# Patient Record
Sex: FEMALE | Race: WHITE | NOT HISPANIC OR LATINO | Employment: OTHER | URBAN - METROPOLITAN AREA
[De-identification: names, ages, dates, MRNs, and addresses within clinical notes are randomized per-mention and may not be internally consistent; named-entity substitution may affect disease eponyms.]

---

## 2022-08-26 ENCOUNTER — PATIENT ROUNDING (BHMG ONLY) (OUTPATIENT)
Dept: INTERNAL MEDICINE | Facility: CLINIC | Age: 67
End: 2022-08-26

## 2022-08-26 ENCOUNTER — TRANSCRIBE ORDERS (OUTPATIENT)
Dept: ADMINISTRATIVE | Facility: HOSPITAL | Age: 67
End: 2022-08-26

## 2022-08-26 ENCOUNTER — OFFICE VISIT (OUTPATIENT)
Dept: INTERNAL MEDICINE | Facility: CLINIC | Age: 67
End: 2022-08-26

## 2022-08-26 VITALS
HEIGHT: 72 IN | OXYGEN SATURATION: 97 % | HEART RATE: 89 BPM | SYSTOLIC BLOOD PRESSURE: 100 MMHG | DIASTOLIC BLOOD PRESSURE: 60 MMHG | BODY MASS INDEX: 23.57 KG/M2 | TEMPERATURE: 96.9 F | WEIGHT: 174 LBS

## 2022-08-26 DIAGNOSIS — Z00.00 PHYSICAL EXAM, ANNUAL: Primary | ICD-10-CM

## 2022-08-26 DIAGNOSIS — Z12.31 ENCOUNTER FOR SCREENING MAMMOGRAM FOR MALIGNANT NEOPLASM OF BREAST: ICD-10-CM

## 2022-08-26 DIAGNOSIS — F17.200 CURRENT SMOKER: ICD-10-CM

## 2022-08-26 DIAGNOSIS — Z71.6 ENCOUNTER FOR SMOKING CESSATION COUNSELING: ICD-10-CM

## 2022-08-26 DIAGNOSIS — E04.1 THYROID NODULE: ICD-10-CM

## 2022-08-26 DIAGNOSIS — Z12.31 BREAST CANCER SCREENING BY MAMMOGRAM: Primary | ICD-10-CM

## 2022-08-26 DIAGNOSIS — Z78.0 POSTMENOPAUSAL: ICD-10-CM

## 2022-08-26 DIAGNOSIS — Z12.2 SCREENING FOR LUNG CANCER: ICD-10-CM

## 2022-08-26 DIAGNOSIS — Z12.11 SCREENING FOR COLON CANCER: ICD-10-CM

## 2022-08-26 PROBLEM — J30.1 SEASONAL ALLERGIC RHINITIS DUE TO POLLEN: Status: ACTIVE | Noted: 2022-08-26

## 2022-08-26 PROBLEM — H91.91 DECREASED HEARING OF RIGHT EAR: Status: ACTIVE | Noted: 2022-08-26

## 2022-08-26 PROBLEM — G43.009 MIGRAINE WITHOUT AURA, NOT INTRACTABLE: Status: ACTIVE | Noted: 2022-08-26

## 2022-08-26 PROBLEM — C44.90 SKIN CANCER: Status: ACTIVE | Noted: 2022-08-26

## 2022-08-26 PROCEDURE — 99387 INIT PM E/M NEW PAT 65+ YRS: CPT | Performed by: FAMILY MEDICINE

## 2022-08-26 PROCEDURE — 90677 PCV20 VACCINE IM: CPT | Performed by: FAMILY MEDICINE

## 2022-08-26 PROCEDURE — 99202 OFFICE O/P NEW SF 15 MIN: CPT | Performed by: FAMILY MEDICINE

## 2022-08-26 PROCEDURE — 90471 IMMUNIZATION ADMIN: CPT | Performed by: FAMILY MEDICINE

## 2022-08-26 NOTE — PROGRESS NOTES
Subjective   Linnea Magdaleno is a 67 y.o. female.   Chief Complaint   Patient presents with   • Annual Exam       History of Present Illness     This is a new patient in our office.    1. CPE-she is here for complete physical exam without GYN evaluation.    She has no complaints.  She has a history of migraine headaches.  She was diagnosed in 1980s.  She had last migraine 3 years ago.  Very infrequent.  She responds to Imitrex.  She does not need refill.  Allergies - she has allergic rhinitis.  She is allergic to pollen and molds and fragrances.  She has no history of asthma.  Skin cancer-basal and squamous.  She was diagnosed in 2022.  She sees dermatologist every 6 months.    Over-the-counter-she only takes allergy medications.  She is allergic to codeine and oxycodone.    She smokes cigarettes for years.  She started in 1980.  She smoked half pack per day for 14 years and in the last 28 years she smokes 2 packs/day.  She says she has no shortness of breath, no wheezing.  She only has cough when her allergies are acting up.  She quit smoking once for 9 months.  She did it with Chantix and patches.  She did not like the way Chantix made her feel.  She does not want to use it again.  She is not ready to quit smoking.    She drinks a few drinks a year.  She does not use drugs.  She is a .    Last dental appointment was years ago.  Last eye exam was last month.  She has never had colon cancer screening.  Mammogram many years ago.  She does not get anymore Pap smears.  She is post hysterectomy which was secondary to benign condition.  She has never had lung cancer screening.    She had tetanus vaccine in 2021.  Flu vaccine last season.  She had COVID-vaccine x2.  She had COVID infection 3 weeks ago.  She recovered fully.        The following portions of the patient's history were reviewed and updated as appropriate: allergies, current medications, past family history, past medical history, past  social history, past surgical history and problem list.    Review of Systems   Constitutional: Negative for chills and fever.   HENT: Negative for congestion and rhinorrhea.    Respiratory: Negative for cough and shortness of breath.    Cardiovascular: Negative for chest pain and palpitations.   Gastrointestinal: Negative for blood in stool.   Neurological: Negative for light-headedness.   Psychiatric/Behavioral: Negative.          Objective   Wt Readings from Last 3 Encounters:   08/26/22 78.9 kg (174 lb)      Vitals:    08/26/22 0748   BP: 100/60   Pulse: 89   Temp: 96.9 °F (36.1 °C)   SpO2: 97%     Temp Readings from Last 3 Encounters:   08/26/22 96.9 °F (36.1 °C)     BP Readings from Last 3 Encounters:   08/26/22 100/60     Pulse Readings from Last 3 Encounters:   08/26/22 89     Body mass index is 23.6 kg/m².    Physical Exam  Vitals reviewed.   Constitutional:       General: She is not in acute distress.     Appearance: She is well-developed. She is not diaphoretic.   HENT:      Head: Normocephalic and atraumatic. Hair is normal.      Right Ear: Hearing, tympanic membrane, ear canal and external ear normal. No decreased hearing noted. No drainage.      Left Ear: Hearing, tympanic membrane, ear canal and external ear normal. No decreased hearing noted.      Nose: No nasal deformity.   Eyes:      General: Lids are normal.         Right eye: No discharge.         Left eye: No discharge.      Conjunctiva/sclera: Conjunctivae normal.      Pupils: Pupils are equal, round, and reactive to light.   Neck:      Thyroid: No thyromegaly.      Vascular: No JVD.      Trachea: No tracheal deviation.      Comments: ~1.5 cm left-sided thyroid nodule.  Soft.  Cardiovascular:      Rate and Rhythm: Normal rate and regular rhythm.      Pulses: Normal pulses.      Heart sounds: Normal heart sounds. No murmur heard.    No friction rub. No gallop.   Pulmonary:      Effort: Pulmonary effort is normal. No respiratory distress.       Breath sounds: Normal breath sounds. No wheezing or rales.   Chest:      Chest wall: No tenderness.   Breasts:      Right: Normal. No swelling, bleeding, inverted nipple, mass, nipple discharge, skin change, tenderness, axillary adenopathy or supraclavicular adenopathy.      Left: Normal. No swelling, bleeding, inverted nipple, mass, nipple discharge, skin change, tenderness, axillary adenopathy or supraclavicular adenopathy.       Abdominal:      General: There is no distension.      Palpations: Abdomen is soft. There is no mass.      Tenderness: There is no abdominal tenderness. There is no guarding or rebound.      Hernia: No hernia is present.   Musculoskeletal:         General: No tenderness or deformity. Normal range of motion.      Cervical back: Normal range of motion and neck supple.   Lymphadenopathy:      Cervical: No cervical adenopathy.      Upper Body:      Right upper body: No supraclavicular or axillary adenopathy.      Left upper body: No supraclavicular or axillary adenopathy.   Skin:     General: Skin is warm and dry.      Findings: No erythema or rash.   Neurological:      Mental Status: She is alert and oriented to person, place, and time.      Cranial Nerves: No cranial nerve deficit.      Motor: No abnormal muscle tone.      Coordination: Coordination normal.      Deep Tendon Reflexes: Reflexes are normal and symmetric. Reflexes normal.   Psychiatric:         Behavior: Behavior normal.         Thought Content: Thought content normal.         Judgment: Judgment normal.         Assessment & Plan   Diagnoses and all orders for this visit:    1. Physical exam, annual (Primary)  -     CBC (No Diff)  -     Comprehensive Metabolic Panel  -     Lipid Panel With LDL / HDL Ratio  -     Thyroid Cascade Profile  -     Urinalysis With Microscopic If Indicated (No Culture) - Urine, Clean Catch  -     Hepatitis C Antibody    2. Encounter for screening mammogram for malignant neoplasm of breast  -     Mammo  Screening Bilateral With CAD; Future    3. Screening for colon cancer  -     Cologuard - Stool, Per Rectum; Future    4. Screening for lung cancer  -      CT Chest Low Dose Cancer Screening WO; Future    5. Current smoker  -      CT Chest Low Dose Cancer Screening WO; Future    6. Encounter for smoking cessation counseling    7. Postmenopausal  -     DEXA Bone Density Axial; Future    8. Thyroid nodule  -     US Thyroid; Future        1. CPE-preventive counseling provided.  We are ordering labs.  We are ordering mammogram, low-dose chest CT and Cologuard.  Colonoscopy versus Cologuard discussed and patient preferred Cologuard.  She is due for bone density and we are ordering it.  Patient is getting pneumonia vaccine today.  She is advised to get Shingrix vaccine.  She is advised to have her hearing tested.  She is advised to schedule office visit with a dentist.  She should start to exercise at least 30 minutes a day, 5 days a week.  Information on healthy heart diet provided.  Sun protection discussed and recommended.    2.smoking cessation- risks of smoking discussed.  Patient not ready to set up a quit date.  Information on quit smoking provided.4 min .  3.  Thyroid nodule -new problem.  We are ordering thyroid ultrasound.  Further recommendations depend on test results.

## 2022-08-26 NOTE — PROGRESS NOTES
August 26, 2022    Patient was referred by her daughter and she did not have any problems making her new patient appointment. Patient states her visit was perfect and she would refer us to her family and friends.

## 2022-09-07 LAB
ALBUMIN SERPL-MCNC: 4.5 G/DL (ref 3.5–5.2)
ALBUMIN/GLOB SERPL: 1.9 G/DL
ALP SERPL-CCNC: 92 U/L (ref 39–117)
ALT SERPL-CCNC: 26 U/L (ref 1–33)
AST SERPL-CCNC: 30 U/L (ref 1–32)
BILIRUB SERPL-MCNC: 0.5 MG/DL (ref 0–1.2)
BUN SERPL-MCNC: 12 MG/DL (ref 8–23)
BUN/CREAT SERPL: 13.3 (ref 7–25)
CALCIUM SERPL-MCNC: 9.5 MG/DL (ref 8.6–10.5)
CHLORIDE SERPL-SCNC: 110 MMOL/L (ref 98–107)
CHOLEST SERPL-MCNC: 201 MG/DL (ref 0–200)
CO2 SERPL-SCNC: 15 MMOL/L (ref 22–29)
CREAT SERPL-MCNC: 0.9 MG/DL (ref 0.57–1)
EGFRCR-CYS SERPLBLD CKD-EPI 2021: 70.2 ML/MIN/1.73
ERYTHROCYTE [DISTWIDTH] IN BLOOD BY AUTOMATED COUNT: 13.2 % (ref 12.3–15.4)
GLOBULIN SER CALC-MCNC: 2.4 GM/DL
GLUCOSE SERPL-MCNC: 89 MG/DL (ref 65–99)
HCT VFR BLD AUTO: 42.5 % (ref 34–46.6)
HDLC SERPL-MCNC: 68 MG/DL (ref 40–60)
HGB BLD-MCNC: 14.4 G/DL (ref 12–15.9)
LDLC SERPL CALC-MCNC: 114 MG/DL (ref 0–100)
LDLC/HDLC SERPL: 1.65 {RATIO}
MCH RBC QN AUTO: 31.8 PG (ref 26.6–33)
MCHC RBC AUTO-ENTMCNC: 33.9 G/DL (ref 31.5–35.7)
MCV RBC AUTO: 93.8 FL (ref 79–97)
PLATELET # BLD AUTO: 303 10*3/MM3 (ref 140–450)
POTASSIUM SERPL-SCNC: 4.4 MMOL/L (ref 3.5–5.2)
PROT SERPL-MCNC: 6.9 G/DL (ref 6–8.5)
RBC # BLD AUTO: 4.53 10*6/MM3 (ref 3.77–5.28)
REQUEST PROBLEM: NORMAL
SODIUM SERPL-SCNC: 142 MMOL/L (ref 136–145)
TRIGL SERPL-MCNC: 105 MG/DL (ref 0–150)
TSH SERPL DL<=0.005 MIU/L-ACNC: 1.11 UIU/ML (ref 0.45–4.5)
VLDLC SERPL CALC-MCNC: 19 MG/DL (ref 5–40)
WBC # BLD AUTO: 6.35 10*3/MM3 (ref 3.4–10.8)

## 2022-10-26 ENCOUNTER — HOSPITAL ENCOUNTER (OUTPATIENT)
Dept: ULTRASOUND IMAGING | Facility: HOSPITAL | Age: 67
Discharge: HOME | End: 2022-10-26

## 2022-10-26 ENCOUNTER — HOSPITAL ENCOUNTER (OUTPATIENT)
Dept: CT IMAGING | Facility: HOSPITAL | Age: 67
Discharge: HOME | End: 2022-10-26

## 2022-10-26 DIAGNOSIS — Z12.2 SCREENING FOR LUNG CANCER: ICD-10-CM

## 2022-10-26 DIAGNOSIS — F17.200 CURRENT SMOKER: ICD-10-CM

## 2022-10-26 DIAGNOSIS — E04.1 THYROID NODULE: ICD-10-CM

## 2022-10-26 PROCEDURE — 71271 CT THORAX LUNG CANCER SCR C-: CPT

## 2022-10-26 PROCEDURE — 76536 US EXAM OF HEAD AND NECK: CPT

## 2022-10-31 DIAGNOSIS — R91.1 LUNG NODULE: Primary | ICD-10-CM

## 2022-11-07 DIAGNOSIS — E04.2 MULTIPLE THYROID NODULES: Primary | ICD-10-CM

## 2023-03-01 ENCOUNTER — HOSPITAL ENCOUNTER (OUTPATIENT)
Dept: BONE DENSITY | Facility: HOSPITAL | Age: 68
Discharge: HOME | End: 2023-03-01
Payer: MEDICARE

## 2023-03-01 ENCOUNTER — HOSPITAL ENCOUNTER (OUTPATIENT)
Dept: MAMMOGRAPHY | Facility: HOSPITAL | Age: 68
Discharge: HOME | End: 2023-03-01
Payer: MEDICARE

## 2023-03-01 DIAGNOSIS — Z12.31 BREAST CANCER SCREENING BY MAMMOGRAM: ICD-10-CM

## 2023-03-01 DIAGNOSIS — Z78.0 POSTMENOPAUSAL: ICD-10-CM

## 2023-03-01 PROCEDURE — 77067 SCR MAMMO BI INCL CAD: CPT

## 2023-03-01 PROCEDURE — 77080 DXA BONE DENSITY AXIAL: CPT

## 2023-03-01 PROCEDURE — 77063 BREAST TOMOSYNTHESIS BI: CPT

## 2023-03-06 ENCOUNTER — TELEPHONE (OUTPATIENT)
Dept: INTERNAL MEDICINE | Facility: CLINIC | Age: 68
End: 2023-03-06
Payer: MEDICARE

## 2023-03-06 DIAGNOSIS — Z12.31 ENCOUNTER FOR SCREENING MAMMOGRAM FOR MALIGNANT NEOPLASM OF BREAST: Primary | ICD-10-CM

## 2023-03-06 DIAGNOSIS — R92.8 ABNORMAL MAMMOGRAM: ICD-10-CM

## 2023-03-06 RX ORDER — SUMATRIPTAN 50 MG/1
TABLET, FILM COATED ORAL
Qty: 9 TABLET | Refills: 2 | Status: SHIPPED | OUTPATIENT
Start: 2023-03-06
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
## 2023-03-20 ENCOUNTER — OFFICE VISIT (OUTPATIENT)
Dept: INTERNAL MEDICINE | Facility: CLINIC | Age: 68
End: 2023-03-20
Payer: MEDICARE

## 2023-03-20 VITALS
OXYGEN SATURATION: 97 % | HEART RATE: 102 BPM | WEIGHT: 174 LBS | HEIGHT: 72 IN | SYSTOLIC BLOOD PRESSURE: 120 MMHG | BODY MASS INDEX: 23.57 KG/M2 | DIASTOLIC BLOOD PRESSURE: 72 MMHG | TEMPERATURE: 96.6 F

## 2023-03-20 DIAGNOSIS — F41.8 SITUATIONAL ANXIETY: ICD-10-CM

## 2023-03-20 DIAGNOSIS — M81.0 AGE-RELATED OSTEOPOROSIS WITHOUT CURRENT PATHOLOGICAL FRACTURE: Primary | ICD-10-CM

## 2023-03-20 PROCEDURE — 1160F RVW MEDS BY RX/DR IN RCRD: CPT | Performed by: FAMILY MEDICINE

## 2023-03-20 PROCEDURE — 1159F MED LIST DOCD IN RCRD: CPT | Performed by: FAMILY MEDICINE

## 2023-03-20 PROCEDURE — 99214 OFFICE O/P EST MOD 30 MIN: CPT | Performed by: FAMILY MEDICINE

## 2023-03-20 RX ORDER — ALENDRONATE SODIUM 70 MG/1
70 TABLET ORAL
Qty: 12 TABLET | Refills: 3 | Status: SHIPPED | OUTPATIENT
Start: 2023-03-20

## 2023-03-20 RX ORDER — HYDROXYZINE HYDROCHLORIDE 25 MG/1
25 TABLET, FILM COATED ORAL 3 TIMES DAILY PRN
Qty: 30 TABLET | Refills: 1 | Status: SHIPPED | OUTPATIENT
Start: 2023-03-20

## 2023-03-20 NOTE — PROGRESS NOTES
Subjective   Linnea Magdaleno is a 67 y.o. female.   Chief Complaint   Patient presents with   • Osteoporosis   • Anxiety       History of Present Illness     1.  Osteoporosis- patient had bone density done in March 2023.  It was positive for osteoporosis.  T score in lumbar spine was -2.7, in left hip -2.3, and right hip -2.0.  She reports normal balance.  No falls.  She has no history of peptic ulcer.  No GERD.    2.  Anxiety- she has lots of stress lately related to the deteriorating health of her mom.  She takes care of her mom.  She worries about her.  She would like to have an as needed medication to take for anxiety.  MIRZA-7 at 6.     Patient had an abnormal mammogram in March 2023.  Ultrasound and diagnostic mammogram were ordered, but she did not have a chance to schedule it because she was taking care of her mom.  And then she forgot about it.    The following portions of the patient's history were reviewed and updated as appropriate: allergies, current medications, past family history, past medical history, past social history, past surgical history and problem list.    Review of Systems   Respiratory: Negative.    Cardiovascular: Negative.    Gastrointestinal: Negative.    Neurological: Negative.    Psychiatric/Behavioral: Negative for suicidal ideas. The patient is nervous/anxious.          Objective   Wt Readings from Last 3 Encounters:   03/20/23 78.9 kg (174 lb)   08/26/22 78.9 kg (174 lb)      Vitals:    03/20/23 0722   BP: 120/72   Pulse: 102   Temp: 96.6 °F (35.9 °C)   SpO2: 97%     Temp Readings from Last 3 Encounters:   03/20/23 96.6 °F (35.9 °C)   08/26/22 96.9 °F (36.1 °C)     BP Readings from Last 3 Encounters:   03/20/23 120/72   08/26/22 100/60     Pulse Readings from Last 3 Encounters:   03/20/23 102   08/26/22 89     Body mass index is 23.59 kg/m².    Physical Exam  Constitutional:       Appearance: She is well-developed.   Cardiovascular:      Rate and Rhythm: Normal rate and regular  rhythm.      Heart sounds: Normal heart sounds.   Pulmonary:      Effort: Pulmonary effort is normal.      Breath sounds: Normal breath sounds.   Skin:     General: Skin is warm and dry.   Neurological:      Mental Status: She is alert and oriented to person, place, and time.   Psychiatric:         Behavior: Behavior normal.         Assessment & Plan   Diagnoses and all orders for this visit:    1. Age-related osteoporosis without current pathological fracture (Primary)  -     Vitamin D,25-Hydroxy    2. Situational anxiety    Other orders  -     alendronate (Fosamax) 70 MG tablet; Take 1 tablet by mouth Every 7 (Seven) Days.  Dispense: 12 tablet; Refill: 3  -     hydrOXYzine (ATARAX) 25 MG tablet; Take 1 tablet by mouth 3 (Three) Times a Day As Needed for Anxiety.  Dispense: 30 tablet; Refill: 1      1.  Osteoporosis- new diagnosis.  We are starting Fosamax.  Side effects including jaw necrosis discussed.  Patient is advised to notify her dentist about starting it.  We are checking vitamin D levels.  She is advised to get 1200 mg of calcium a day, preferably from diet.  Weightbearing exercise.  Yoga/domingo chi to keep good balance.  Fall prevention discussed.    2.  Situational anxiety-we will prescribe hydroxyzine to be used as needed.  Warnings on drowsiness.  Follow-up in 6 months or sooner if problems.    Abnormal mammogram- discussed with patient importance of following up.  She is going to call and schedule it.  She just forgot about it because she was busy  caring for her mom.              BMI is within normal parameters. No other follow-up for BMI required.

## 2023-04-17 ENCOUNTER — HOSPITAL ENCOUNTER (OUTPATIENT)
Dept: ULTRASOUND IMAGING | Facility: HOSPITAL | Age: 68
Discharge: HOME | End: 2023-04-17
Payer: MEDICARE

## 2023-04-17 ENCOUNTER — HOSPITAL ENCOUNTER (OUTPATIENT)
Dept: MAMMOGRAPHY | Facility: HOSPITAL | Age: 68
Discharge: HOME | End: 2023-04-17
Payer: MEDICARE

## 2023-04-17 DIAGNOSIS — R92.8 ABNORMAL MAMMOGRAM: ICD-10-CM

## 2023-04-17 DIAGNOSIS — R92.8 ABNORMAL MAMMOGRAM: Primary | ICD-10-CM

## 2023-04-17 PROCEDURE — 76642 ULTRASOUND BREAST LIMITED: CPT

## 2023-04-17 PROCEDURE — 77065 DX MAMMO INCL CAD UNI: CPT

## 2023-04-17 PROCEDURE — G0279 TOMOSYNTHESIS, MAMMO: HCPCS

## 2023-04-18 ENCOUNTER — TELEPHONE (OUTPATIENT)
Dept: INTERNAL MEDICINE | Facility: CLINIC | Age: 68
End: 2023-04-18
Payer: MEDICARE
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
## 2023-05-02 ENCOUNTER — TELEPHONE (OUTPATIENT)
Dept: INTERNAL MEDICINE | Facility: CLINIC | Age: 68
End: 2023-05-02
Payer: MEDICARE
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
## 2023-05-02 NOTE — TELEPHONE ENCOUNTER
Patient returned call, she said that she is still out of town and will be back by next Wednesday. Will send in Imitrex refill when she is back in town and able to pick it up from the pharmacy. Transferred call to the front to schedule an appointment with same day labs to discuss switching osteoporosis med from fosamax.

## 2023-05-09 ENCOUNTER — TELEPHONE (OUTPATIENT)
Dept: INTERNAL MEDICINE | Facility: CLINIC | Age: 68
End: 2023-05-09
Payer: MEDICARE

## 2023-05-09 RX ORDER — SUMATRIPTAN 50 MG/1
TABLET, FILM COATED ORAL
Qty: 9 TABLET | Refills: 2 | Status: SHIPPED | OUTPATIENT
Start: 2023-05-09

## 2023-05-09 NOTE — TELEPHONE ENCOUNTER
Rx sent to pharmacy. Called and informed patient.      ----- Message from Anupama Nugent MA sent at 5/2/2023  4:39 PM EDT -----  Send in Imitrex refill for patient - back in [facility] [address] by 5/10.

## 2023-05-31 ENCOUNTER — HOSPITAL ENCOUNTER (OUTPATIENT)
Dept: CT IMAGING | Facility: HOSPITAL | Age: 68
Discharge: HOME | End: 2023-05-31
Admitting: FAMILY MEDICINE

## 2023-05-31 PROCEDURE — 71250 CT THORAX DX C-: CPT

## 2023-06-08 DIAGNOSIS — Z12.2 SCREENING FOR LUNG CANCER: Primary | ICD-10-CM

## 2023-06-10 LAB — 25(OH)D3+25(OH)D2 SERPL-MCNC: 40 NG/ML (ref 30–100)

## 2023-06-13 DIAGNOSIS — Z12.2 ENCOUNTER FOR SCREENING FOR LUNG CANCER: ICD-10-CM

## 2023-06-13 DIAGNOSIS — F17.200 CURRENT SMOKER: ICD-10-CM

## 2023-06-13 DIAGNOSIS — Z12.2 SCREENING FOR LUNG CANCER: Primary | ICD-10-CM

## 2023-06-15 DIAGNOSIS — Z87.891 PERSONAL HISTORY OF NICOTINE DEPENDENCE: ICD-10-CM

## 2023-06-15 DIAGNOSIS — F17.200 CURRENT SMOKER: Primary | ICD-10-CM

## 2023-07-12 PROBLEM — M81.0 AGE-RELATED OSTEOPOROSIS WITHOUT CURRENT PATHOLOGICAL FRACTURE: Status: ACTIVE | Noted: 2023-07-12

## 2023-07-27 ENCOUNTER — HOSPITAL ENCOUNTER (OUTPATIENT)
Dept: INFUSION THERAPY | Facility: HOSPITAL | Age: 68
Discharge: HOME | End: 2023-07-27
Payer: MEDICARE

## 2023-07-27 VITALS
WEIGHT: 175 LBS | HEART RATE: 73 BPM | BODY MASS INDEX: 23.73 KG/M2 | DIASTOLIC BLOOD PRESSURE: 82 MMHG | SYSTOLIC BLOOD PRESSURE: 122 MMHG | OXYGEN SATURATION: 99 % | RESPIRATION RATE: 20 BRPM | TEMPERATURE: 97.2 F

## 2023-07-27 DIAGNOSIS — M81.0 AGE-RELATED OSTEOPOROSIS WITHOUT CURRENT PATHOLOGICAL FRACTURE: Primary | ICD-10-CM

## 2023-07-27 PROCEDURE — 25010000002 ZOLEDRONIC ACID 5 MG/100ML SOLUTION: Performed by: FAMILY MEDICINE

## 2023-07-27 PROCEDURE — 96365 THER/PROPH/DIAG IV INF INIT: CPT

## 2023-07-27 PROCEDURE — 96374 THER/PROPH/DIAG INJ IV PUSH: CPT

## 2023-07-27 RX ORDER — SODIUM CHLORIDE 9 MG/ML
250 INJECTION, SOLUTION INTRAVENOUS ONCE
OUTPATIENT
Start: 2024-07-26

## 2023-07-27 RX ORDER — SODIUM CHLORIDE 9 MG/ML
250 INJECTION, SOLUTION INTRAVENOUS ONCE
Status: DISCONTINUED | OUTPATIENT
Start: 2023-07-27 | End: 2023-07-29 | Stop reason: HOSPADM

## 2023-07-27 RX ORDER — ZOLEDRONIC ACID 5 MG/100ML
5 INJECTION, SOLUTION INTRAVENOUS ONCE
Status: COMPLETED | OUTPATIENT
Start: 2023-07-27 | End: 2023-07-27

## 2023-07-27 RX ORDER — ZOLEDRONIC ACID 5 MG/100ML
5 INJECTION, SOLUTION INTRAVENOUS ONCE
Status: CANCELLED | OUTPATIENT
Start: 2024-07-26

## 2023-07-27 RX ORDER — ZOLEDRONIC ACID 5 MG/100ML
5 INJECTION, SOLUTION INTRAVENOUS ONCE
COMMUNITY

## 2023-07-27 RX ADMIN — ZOLEDRONIC ACID 5 MG: 0.05 INJECTION, SOLUTION INTRAVENOUS at 09:06

## 2023-08-22 DIAGNOSIS — Z00.00 PHYSICAL EXAM, ANNUAL: Primary | ICD-10-CM

## 2023-08-23 LAB
ALBUMIN SERPL-MCNC: 4.6 G/DL (ref 3.5–5.2)
ALBUMIN/GLOB SERPL: 1.8 G/DL
ALP SERPL-CCNC: 105 U/L (ref 39–117)
ALT SERPL-CCNC: 27 U/L (ref 1–33)
AST SERPL-CCNC: 19 U/L (ref 1–32)
BILIRUB SERPL-MCNC: 0.5 MG/DL (ref 0–1.2)
BUN SERPL-MCNC: 14 MG/DL (ref 8–23)
BUN/CREAT SERPL: 16.1 (ref 7–25)
CALCIUM SERPL-MCNC: 9.4 MG/DL (ref 8.6–10.5)
CHLORIDE SERPL-SCNC: 106 MMOL/L (ref 98–107)
CHOLEST SERPL-MCNC: 206 MG/DL (ref 0–200)
CO2 SERPL-SCNC: 24.8 MMOL/L (ref 22–29)
CREAT SERPL-MCNC: 0.87 MG/DL (ref 0.57–1)
EGFRCR SERPLBLD CKD-EPI 2021: 72.7 ML/MIN/1.73
ERYTHROCYTE [DISTWIDTH] IN BLOOD BY AUTOMATED COUNT: 12.9 % (ref 12.3–15.4)
GLOBULIN SER CALC-MCNC: 2.5 GM/DL
GLUCOSE SERPL-MCNC: 90 MG/DL (ref 65–99)
HCT VFR BLD AUTO: 43.2 % (ref 34–46.6)
HCV IGG SERPL QL IA: NON REACTIVE
HDLC SERPL-MCNC: 59 MG/DL (ref 40–60)
HGB BLD-MCNC: 14.6 G/DL (ref 12–15.9)
LDLC SERPL CALC-MCNC: 116 MG/DL (ref 0–100)
LDLC/HDLC SERPL: 1.88 {RATIO}
MCH RBC QN AUTO: 31.8 PG (ref 26.6–33)
MCHC RBC AUTO-ENTMCNC: 33.8 G/DL (ref 31.5–35.7)
MCV RBC AUTO: 94.1 FL (ref 79–97)
PLATELET # BLD AUTO: 333 10*3/MM3 (ref 140–450)
POTASSIUM SERPL-SCNC: 4.5 MMOL/L (ref 3.5–5.2)
PROT SERPL-MCNC: 7.1 G/DL (ref 6–8.5)
RBC # BLD AUTO: 4.59 10*6/MM3 (ref 3.77–5.28)
SODIUM SERPL-SCNC: 141 MMOL/L (ref 136–145)
TRIGL SERPL-MCNC: 180 MG/DL (ref 0–150)
TSH SERPL DL<=0.005 MIU/L-ACNC: 1.39 UIU/ML (ref 0.45–4.5)
VLDLC SERPL CALC-MCNC: 31 MG/DL (ref 5–40)
WBC # BLD AUTO: 10.56 10*3/MM3 (ref 3.4–10.8)

## 2023-08-29 ENCOUNTER — OFFICE VISIT (OUTPATIENT)
Dept: INTERNAL MEDICINE | Facility: CLINIC | Age: 68
End: 2023-08-29
Payer: MEDICARE

## 2023-08-29 VITALS
SYSTOLIC BLOOD PRESSURE: 118 MMHG | TEMPERATURE: 98.2 F | HEART RATE: 72 BPM | BODY MASS INDEX: 24.38 KG/M2 | DIASTOLIC BLOOD PRESSURE: 72 MMHG | OXYGEN SATURATION: 97 % | WEIGHT: 180 LBS | HEIGHT: 72 IN

## 2023-08-29 DIAGNOSIS — Z00.00 WELCOME TO MEDICARE PREVENTIVE VISIT: ICD-10-CM

## 2023-08-29 DIAGNOSIS — Z00.00 PHYSICAL EXAM, ANNUAL: Primary | ICD-10-CM

## 2023-08-29 DIAGNOSIS — Z71.6 ENCOUNTER FOR SMOKING CESSATION COUNSELING: ICD-10-CM

## 2023-08-29 DIAGNOSIS — E04.1 THYROID NODULE: ICD-10-CM

## 2023-08-29 DIAGNOSIS — G43.009 MIGRAINE WITHOUT AURA AND WITHOUT STATUS MIGRAINOSUS, NOT INTRACTABLE: ICD-10-CM

## 2023-08-29 PROCEDURE — G0402 INITIAL PREVENTIVE EXAM: HCPCS | Performed by: FAMILY MEDICINE

## 2023-08-29 PROCEDURE — 1159F MED LIST DOCD IN RCRD: CPT | Performed by: FAMILY MEDICINE

## 2023-08-29 PROCEDURE — 1170F FXNL STATUS ASSESSED: CPT | Performed by: FAMILY MEDICINE

## 2023-08-29 PROCEDURE — 1160F RVW MEDS BY RX/DR IN RCRD: CPT | Performed by: FAMILY MEDICINE

## 2023-08-29 PROCEDURE — 99397 PER PM REEVAL EST PAT 65+ YR: CPT | Performed by: FAMILY MEDICINE

## 2023-08-29 NOTE — PATIENT INSTRUCTIONS
Medicare Wellness  Personal Prevention Plan of Service     Date of Office Visit:    Encounter Provider:  Lacy Rivera MD  Place of Service:  [facility]  Patient Name: Linnea Magdaleno  :  1955    As part of the Medicare Wellness portion of your visit today, we are providing you with this personalized preventive plan of services (PPPS). This plan is based upon recommendations of the [place] Preventive Services Task Force (USPSTF) and the Advisory Committee on Immunization Practices (ACIP).    This lists the preventive care services that should be considered, and provides dates of when you are due. Items listed as completed are up-to-date and do not require any further intervention.    Health Maintenance   Topic Date Due    ANNUAL WELLNESS VISIT  Never done    COVID-19 Vaccine (4 - Pfizer series) 2023 (Originally 2023)    ZOSTER VACCINE (1 of 2) 2025 (Originally 2005)    INFLUENZA VACCINE  10/01/2023    LUNG CANCER SCREENING  2024    DXA SCAN  2025    MAMMOGRAM  2025    COLORECTAL CANCER SCREENING  2025    TDAP/TD VACCINES (2 - Td or Tdap) 10/11/2031    HEPATITIS C SCREENING  Completed    Pneumococcal Vaccine 65+  Completed       Orders Placed This Encounter   Procedures    US Thyroid     Standing Status:   Future     Standing Expiration Date:   2024     Scheduling Instructions:      Due in 10/2023     Order Specific Question:   Reason for Exam:     Answer:   nodules     Order Specific Question:   Release to patient     Answer:   Routine Release [[id number]]       Return in about 6 months (around 2024).

## 2023-08-29 NOTE — PROGRESS NOTES
The ABCs of the Annual Wellness Visit  [facility]    Subjective     Linnea Magdaleno is a 68 y.o. female who presents for a  Welcome to Medicare Visit.    The following portions of the patient's history were reviewed and   updated as appropriate: allergies, current medications, past family history, past medical history, past social history, past surgical history, and problem list.     Compared to one year ago, the patient feels her physical   health is the same.    Compared to one year ago, the patient feels her mental   health is the same.    Recent Hospitalizations:  She was not admitted to the hospital during the last year.       Current Medical Providers:  Patient Care Team:  Lacy Rivera MD as PCP - General (Family Medicine)    Outpatient Medications Prior to Visit   Medication Sig Dispense Refill    hydrOXYzine (ATARAX) 25 MG tablet Take 1 tablet by mouth 3 (Three) Times a Day As Needed for Anxiety. 30 tablet 0    SUMAtriptan (Imitrex) 50 MG tablet Take one tablet at onset of headache. May repeat dose one time in 2 hours if headache not relieved. 9 tablet 2    zoledronic acid (RECLAST) 5 MG/100ML solution infusion Infuse 100 mL into a venous catheter 1 (One) Time.      alendronate (Fosamax) 70 MG tablet Take 1 tablet by mouth Every 7 (Seven) Days. 12 tablet 3     No facility-administered medications prior to visit.       No opioid medication identified on active medication list. I have reviewed chart for other potential  high risk medication/s and harmful drug interactions in the elderly.        Aspirin is not on active medication list.  Aspirin use is not indicated based on review of current medical condition/s. Risk of harm outweighs potential benefits.  . No CAD, no h/o stroke, no known PVD.    Patient Active Problem List   Diagnosis    Migraine without aura, not intractable    Skin cancer    Seasonal allergic rhinitis due to pollen    Decreased hearing of right ear    Age-related  "osteoporosis without current pathological fracture     Advance Care Planning   Advance Care Planning     Advance Directive is not on file.  ACP discussion was held with the patient during this visit. Patient does not have an advance directive, information provided.       Objective   Vitals:    23 0756   BP: 118/72   Pulse: 72   Temp: 98.2 øF (36.8 øC)   SpO2: 97%   Weight: 81.6 kg (180 lb)   Height: 182.9 cm (72.01\")   PainSc: 0-No pain     Estimated body mass index is 24.41 kg/mý as calculated from the following:    Height as of this encounter: 182.9 cm (72.01\").    Weight as of this encounter: 81.6 kg (180 lb).    BMI is within normal parameters. No other follow-up for BMI required.      Does the patient have evidence of cognitive impairment?   No    Lab Results   Component Value Date    CHLPL 206 (H) 2023    TRIG 180 (H) 2023    HDL 59 2023     (H) 2023    VLDL 31 2023       Procedures       HEALTH RISK ASSESSMENT    Smoking Status:  Social History     Tobacco Use   Smoking Status Every Day    Packs/day: 2.00    Years: 15.00    Pack years: 30.00    Types: Cigarettes    Start date: 1992    Passive exposure: Current   Smokeless Tobacco Never     Alcohol Consumption:  Social History     Substance and Sexual Activity   Alcohol Use Yes    Comment: rare       Fall Risk Screen:    STEADI Fall Risk Assessment was completed, and patient is at LOW risk for falls.Assessment completed on:2023    Depression Screen:       2023     8:00 AM   PHQ-2/PHQ-9 Depression Screening   Little Interest or Pleasure in Doing Things 0-->not at all   Feeling Down, Depressed or Hopeless 0-->not at all   PHQ-9: Brief Depression Severity Measure Score 0       Health Habits and Functional and Cognitive Screenin/29/2023     8:00 AM   Functional & Cognitive Status   Do you have difficulty preparing food and eating? No   Do you have difficulty bathing yourself, getting dressed or " grooming yourself? No   Do you have difficulty using the toilet? No   Do you have difficulty moving around from place to place? No   Do you have trouble with steps or getting out of a bed or a chair? No   Current Diet Well Balanced Diet   Dental Exam Not up to date   Eye Exam Not up to date   Exercise (times per week) 0 times per week   Current Exercises Include No Regular Exercise   Do you need help using the phone?  Yes   Are you deaf or do you have serious difficulty hearing?  Yes   Do you need help to go to places out of walking distance? No   Do you need help shopping? No   Do you need help preparing meals?  No   Do you need help with housework?  No   Do you need help with laundry? No   Do you need help taking your medications? No   Do you need help managing money? No   Do you ever drive or ride in a car without wearing a seat belt? No   Have you felt unusual stress, anger or loneliness in the last month? No   Who do you live with? Other   If you need help, do you have trouble finding someone available to you? No   Have you been bothered in the last four weeks by sexual problems? No   Do you have difficulty concentrating, remembering or making decisions? No       Visual Acuity:    Vision Screening    Right eye Left eye Both eyes   Without correction      With correction 20/30 20/30 20/20       Age-appropriate Screening Schedule:  Refer to the list below for future screening recommendations based on patient's age, sex and/or medical conditions. Orders for these recommended tests are listed in the plan section. The patient has been provided with a written plan.    Health Maintenance   Topic Date Due    ANNUAL WELLNESS VISIT  Never done    COVID-19 Vaccine (4 - Pfizer series) 08/31/2023 (Originally 4/19/2023)    ZOSTER VACCINE (1 of 2) 01/01/2025 (Originally 4/6/2005)    INFLUENZA VACCINE  10/01/2023    LUNG CANCER SCREENING  05/31/2024    DXA SCAN  03/01/2025    MAMMOGRAM  04/17/2025    COLORECTAL CANCER  SCREENING  09/19/2025    TDAP/TD VACCINES (2 - Td or Tdap) 10/11/2031    HEPATITIS C SCREENING  Completed    Pneumococcal Vaccine 65+  Completed        CMS Preventative Services Quick Reference  Risk Factors Identified During Encounter    Immunizations Discussed/Encouraged: Influenza, Shingrix, and COVID19  Inactivity/Sedentary: Patient was advised to exercise at least 150 minutes a week per CDC recommendations.  Tobacco Use/Dependance Risk   Dental Screening Recommended  The above risks/problems have been discussed with the patient.  Pertinent information has been shared with the patient in the After Visit Summary.  Follow up plans and orders are seen below in the Assessment/Plan Section.    Information on healthy heart diet provided.  She is advised to add fruits and vegetables to her diet  Information on exercise to stay healthy provided.  She should get at least 30 minutes a day, 5 days a week.  We discussed smoking cessation.  Patient is aware of risks.  She says that she knows that she needs to stop it, but she does not want to.  Information on steps to quit smoking provided.  She is overdue for dental appointment and is advised to schedule it.  She is scheduled for eye exam.  She is  due for mammogram in October 2023 and is going to schedule it.  She is due for low-dose chest CT in November 2023 and will call to schedule it.  Hearing is little decreased, but she can have normal conversation.  We discussed vaccinations recommendations including flu vaccine, COVID-vaccine, Shingrix vaccine and RSV vaccine.      Diagnoses and all orders for this visit:    1. Physical exam, annual (Primary)    2. Welcome to Medicare preventive visit    3. Thyroid nodule  -     US Thyroid; Future    4. Encounter for smoking cessation counseling    5. Migraine without aura and without status migrainosus, not intractable        Follow Up:   Initial Medicare Visit in one year    An After Visit Summary and PPPS were made available to  the patient.

## 2023-08-29 NOTE — PROGRESS NOTES
Subjective   Linnea Magdaleno is a 68 y.o. female.   Chief Complaint   Patient presents with    Annual Exam    Welcome To Medicare    thyroid nodule    Migraine       History of Present Illness     CPE-she is here for complete physical exam without GYN evaluation.   She has no complaints.    There is no change in medical, surgical or family history from last office visit.  No change in prescription medications.  Over-the-counter she takes Zyrtec every day, Tylenol, multivitamin on and off, vitamin D and calcium on and off, on average 3-4 times a week.    Migraine headaches.  She was diagnosed in 1980s.  No migraines in the last year.  She responds to Imitrex.  She does not need refill.    No new allergies to medications     She smokes cigarettes for years.  She started in 1980.  She smoked half pack per day for 14 years and in the last 28 years she smokes 2 packs/day.  She says she has no shortness of breath, no wheezing.  She only has cough when her allergies are acting up.  She quit smoking once for 9 months.  She did it with Chantix and patches.  She did not like the way Chantix made her feel.  She does not want to use it again.  She is not ready to quit smoking.  She is aware of risks.  Currently she smokes 1 pack/day.       She drinks a few drinks a year.  She does not use drugs.  She is a .     Last dental appointment was years ago.  Last eye exam was last year and she is going to schedule it for this year.  Cologuard in 9/2022.  Mammogram up-to-date.   She does not get anymore Pap smears.  She is post hysterectomy which was secondary to benign condition.  Low-dose chest CT less than a year ago.     She had tetanus vaccine in 2021. She had COVID-vaccine x3.     2.  Thyroid nodules-she had thyroid ultrasound in October 2022.  Multiple nodules.  Follow-up was recommended 1 year later.  No problems with swallowing.  No change.    Review of Systems   Constitutional: Negative.    Respiratory:   Negative for shortness of breath.    Cardiovascular:  Negative for chest pain and palpitations.   Gastrointestinal:  Negative for blood in stool.   Genitourinary:  Negative for hematuria.   Neurological:  Negative for light-headedness.   Psychiatric/Behavioral: Negative.  Negative for suicidal ideas.        Objective   Wt Readings from Last 3 Encounters:   08/29/23 81.6 kg (180 lb)   07/27/23 79.4 kg (175 lb)   07/05/23 80.3 kg (177 lb)      Vitals:    08/29/23 0756   BP: 118/72   Pulse: 72   Temp: 98.2 øF (36.8 øC)   SpO2: 97%     Temp Readings from Last 3 Encounters:   08/29/23 98.2 øF (36.8 øC)   07/27/23 97.2 øF (36.2 øC) (Infrared)   07/05/23 98 øF (36.7 øC)     BP Readings from Last 3 Encounters:   08/29/23 118/72   07/27/23 122/82   07/05/23 118/70     Pulse Readings from Last 3 Encounters:   08/29/23 72   07/27/23 73   07/05/23 78     Body mass index is 24.41 kg/mý.    Physical Exam  Vitals reviewed.   Constitutional:       General: She is not in acute distress.     Appearance: She is well-developed. She is not diaphoretic.   HENT:      Head: Normocephalic and atraumatic. Hair is normal.      Right Ear: Hearing, tympanic membrane, ear canal and external ear normal. No decreased hearing noted. No drainage.      Left Ear: Hearing, tympanic membrane, ear canal and external ear normal. No decreased hearing noted.      Nose: No nasal deformity.   Eyes:      General: Lids are normal.         Right eye: No discharge.         Left eye: No discharge.      Conjunctiva/sclera: Conjunctivae normal.      Pupils: Pupils are equal, round, and reactive to light.   Neck:      Thyroid: No thyromegaly.      Vascular: No JVD.      Trachea: No tracheal deviation.   Cardiovascular:      Rate and Rhythm: Normal rate and regular rhythm.      Pulses: Normal pulses.      Heart sounds: Normal heart sounds. No murmur heard.    No friction rub. No gallop.   Pulmonary:      Effort: Pulmonary effort is normal. No respiratory distress.       Breath sounds: Normal breath sounds. No wheezing or rales.   Chest:      Chest wall: No tenderness.   Abdominal:      General: There is no distension.      Palpations: Abdomen is soft. There is no mass.      Tenderness: There is no abdominal tenderness. There is no guarding or rebound.      Hernia: No hernia is present.   Musculoskeletal:         General: No tenderness or deformity. Normal range of motion.      Cervical back: Normal range of motion.   Lymphadenopathy:      Cervical: No cervical adenopathy.      Upper Body:      Right upper body: No supraclavicular adenopathy.      Left upper body: No supraclavicular adenopathy.   Skin:     General: Skin is warm and dry.      Findings: No erythema or rash.   Neurological:      Mental Status: She is alert and oriented to person, place, and time.      Cranial Nerves: No cranial nerve deficit.      Motor: No abnormal muscle tone.      Coordination: Coordination normal.      Deep Tendon Reflexes: Reflexes are normal and symmetric. Reflexes normal.   Psychiatric:         Behavior: Behavior normal.         Thought Content: Thought content normal.         Judgment: Judgment normal.       Assessment & Plan   Diagnoses and all orders for this visit:    1. Physical exam, annual (Primary)    2. Welcome to Medicare preventive visit    3. Thyroid nodule  -     US Thyroid; Future    4. Encounter for smoking cessation counseling    5. Migraine without aura and without status migrainosus, not intractable          CPE-preventive counseling provided.  Blood work results reviewed with patient.  She is advised to schedule appointment with a dentist.  She is overdue.  She is advised to exercise at least 30 minutes a day, 5 days a week.  She is advised on dietary changes.  She should add fruits and vegetables.  She will be due for lung cancer screening in November 2023.  She will be due for mammogram in October 2023.  We reviewed recommendations for vaccinations including flu vaccine,  COVID-vaccine, Shingrix vaccine and RSV vaccine.  Smoking cessation-she is aware of risks.  She is not ready to quit.  Information on quitting smoking provided.  3 minutes.  Thyroid nodules-we are ordering thyroid ultrasound to be done in October 2023.  Migraine headaches-stable.  Continue same.  Follow-up in 12 months.        BMI is within normal parameters. No other follow-up for BMI required.

## 2023-11-13 ENCOUNTER — HOSPITAL ENCOUNTER (OUTPATIENT)
Dept: MAMMOGRAPHY | Facility: HOSPITAL | Age: 68
Discharge: HOME | End: 2023-11-13
Payer: MEDICARE

## 2023-11-13 ENCOUNTER — HOSPITAL ENCOUNTER (OUTPATIENT)
Dept: ULTRASOUND IMAGING | Facility: HOSPITAL | Age: 68
Discharge: HOME | End: 2023-11-13
Payer: MEDICARE

## 2023-11-13 ENCOUNTER — HOSPITAL ENCOUNTER (OUTPATIENT)
Dept: ULTRASOUND IMAGING | Facility: HOSPITAL | Age: 68
Discharge: HOME | End: 2023-11-13
Admitting: FAMILY MEDICINE
Payer: MEDICARE

## 2023-11-13 DIAGNOSIS — E04.1 THYROID NODULE: ICD-10-CM

## 2023-11-13 DIAGNOSIS — R92.8 ABNORMAL MAMMOGRAM: ICD-10-CM

## 2023-11-13 PROCEDURE — 77065 DX MAMMO INCL CAD UNI: CPT

## 2023-11-13 PROCEDURE — G0279 TOMOSYNTHESIS, MAMMO: HCPCS

## 2023-11-13 PROCEDURE — 76642 ULTRASOUND BREAST LIMITED: CPT

## 2023-11-13 PROCEDURE — 76536 US EXAM OF HEAD AND NECK: CPT

## 2023-11-14 DIAGNOSIS — R92.8 ABNORMAL MAMMOGRAM: Primary | ICD-10-CM

## 2023-11-16 ENCOUNTER — PATIENT MESSAGE (OUTPATIENT)
Dept: INTERNAL MEDICINE | Facility: CLINIC | Age: 68
End: 2023-11-16

## 2023-11-16 DIAGNOSIS — Z12.31 SCREENING MAMMOGRAM FOR BREAST CANCER: Primary | ICD-10-CM

## 2023-11-16 DIAGNOSIS — E04.1 THYROID NODULE: Primary | ICD-10-CM

## 2023-11-30 ENCOUNTER — TELEPHONE (OUTPATIENT)
Dept: SURGERY | Facility: CLINIC | Age: 68
End: 2023-11-30
Payer: MEDICARE

## 2023-12-07 ENCOUNTER — TELEPHONE (OUTPATIENT)
Dept: INTERNAL MEDICINE | Facility: CLINIC | Age: 68
End: 2023-12-07
Payer: MEDICARE

## 2023-12-07 NOTE — TELEPHONE ENCOUNTER
Spoke with patient daughter, she was unaware patient did get results for US and mammogram. Did let patient daughter know patient is aware of the results of US and Mammo, they were done at the same time. Also let patient daughter know where she was referred to and provided the number to help ease the anxiety and stress patient is under now during the waiting period.

## 2023-12-07 NOTE — TELEPHONE ENCOUNTER
Caller: JODY BARAHONA    Relationship: Emergency Contact    Best call back number: [phone]     What test was performed: ULTRASOUND RIGHT BREAST    When was the test performed: BEFORE 11-    Where was the test performed: DOES NOT KNOW    Additional notes: PATIENTS DAUGHTER STATED THE PATIENT HAS NOT HEARD BACK REGARDING THIS FOR THREE WEEKS.    PATIENTS DAUGHTER IS REQUESTING THE PATIENT BE CALLED WITH THESE RESULTS

## 2023-12-08 NOTE — TELEPHONE ENCOUNTER
Gave patient's daughter the phone number to call [facility] office. She needs a biopsy not an ultrasound

## 2023-12-15 ENCOUNTER — HOSPITAL ENCOUNTER (OUTPATIENT)
Dept: CT IMAGING | Facility: HOSPITAL | Age: 68
Discharge: HOME | End: 2023-12-15
Admitting: FAMILY MEDICINE
Payer: MEDICARE

## 2023-12-15 DIAGNOSIS — Z87.891 PERSONAL HISTORY OF NICOTINE DEPENDENCE: ICD-10-CM

## 2023-12-15 DIAGNOSIS — F17.200 CURRENT SMOKER: ICD-10-CM

## 2023-12-15 PROCEDURE — 71271 CT THORAX LUNG CANCER SCR C-: CPT

## 2023-12-18 NOTE — PROGRESS NOTES
[facility]     Referring Provider: Lacy Rivera MD     Chief complaint: abnormal breast imaging     HPI: Ms. Linnea Magdaleno is a 69 yo woman, seen at the request of Lacy Rivera MD, for abnormal breast imaging    I personally reviewed her records and summarized her relevant breast history/imaging:    3/1/2023 bilateral screening mammogram at [facility]  FINDINGS:  There are scattered areas of fibroglandular density.  There is a focal asymmetry in the subareolar right breast. There is a  focal asymmetry in the upper central middle to posterior right breast.  There are no suspicious masses, calcifications, or areas of  architectural distortion in the left breast.  IMPRESSION/RECOMMENDATION(S):  Right breast focal asymmetries. Recommend further evaluation with CC and  MLO spot compression and lateral views with Tomosynthesis and targeted  ultrasound.  No mammographic evidence of malignancy in the left breast.  BI-RADS Category 0    4/17/2023 right diagnostic mammogram right Limited breast ultrasound at [facility]  FINDINGS:  MAMMOGRAM: Right CC and MLO spot compression 2-D and Tomosynthesis views  were obtained.    There are scattered areas of fibroglandular density.   At 12:00 in the anterior right breast, there is a persistent 0.8 cm oval  mass. At 12:00 in the middle right breast, there is a persistent  approximately 1 cm oval mass with obscured margins. These areas were  further assessed with ultrasound.  ULTRASOUND:  Targeted sonographic evaluation of the right breast was performed at  12:00 in the region of the mammographic abnormalities. At 12:00, 4 cm  from the nipple, there is a 1.1 x 0.4 x 0.8 cm minimally complicated  cyst. At 12:00, 1 cm from the nipple, there is a 0.6 x 0.7 x 0.4 cm  minimally complicated septated cyst versus cluster of microcysts. These  are probably benign.  IMPRESSION:  Masses at 12:00 in the right breast are probably benign. Recommend  short-term follow-up  diagnostic right breast mammogram and targeted  ultrasound in 6 months to document six-month stability. Comparison with  prior imaging would also be helpful, if available.  BI-RADS Category 3    11/13/2023 right diagnostic mammogram and right limited ultrasound at [facility]  FINDINGS: Unilateral right digital CC and MLO mammographic and  tomosynthesis images were obtained. Comparison is made to prior  mammography dated 03/01/2023 and 04/17/2023.  Scattered fibroglandular densities are seen throughout the right breast.  In the middle third of the right breast at the 12 o'clock position there  is a persistent area of focal asymmetry. There is a suggestion of  developing architectural distortion in the middle third at the 12  o'clock position seen primarily on the CC tomosynthesis images. No  suspicious calcifications are appreciated. There is no evidence for skin  thickening, nipple retraction or axillary adenopathy.  ULTRASOUND: Targeted sonographic evaluation of the right breast was  performed from the 11 o'clock to 1 o'clock positions. Comparison is made  to prior right breast sonography dated 04/17/2023.  At the 12 o'clock position on the order of 4 cm from the nipple there is  a 1.2 cm benign cyst.  At the 12 o'clock position on the order 1 cm from the nipple there is a  0.5 cm oval circumscribed hypoechoic mass that previously measured 0.7  cm in greatest dimension. No detectable internal vascularity is  appreciated. This is most consistent with a benign complex cyst.  IMPRESSION:  1. There is an area of suspected evolving architectural distortion  associated with an area of focal asymmetry seen in the right breast in  the middle third at the 12 o'clock position. No definite sonographic  correlate is appreciated. Further evaluation with a tomosynthesis guided  right breast biopsy is recommended.  2. Benign cysts in the right breast at the 12 o'clock position as  described.  BI-RADS Category 4    She has a personal  history of osteoporosis, squamous cell carcinoma 2022,     She has a family history of breast cancer in her maternal aunt age unknown, paternal second cousin age 58.  She has a family history of ovarian cancer in her paternal great aunt age unknown.   She has no family history of cervical cancer     Today she presents with concerns regarding abnormal breast imaging and need for stereotactic right breast biopsy  She denies any breast lumps, pain, skin changes, or nipple discharge.  She denies any prior history of abnormal mammograms or breast biopsies.       Review of Systems - Oncology    Medications:    Current Outpatient Medications:     hydrOXYzine (ATARAX) 25 MG tablet, Take 1 tablet by mouth 3 (Three) Times a Day As Needed for Anxiety., Disp: 30 tablet, Rfl: 0    SUMAtriptan (Imitrex) 50 MG tablet, Take one tablet at onset of headache. May repeat dose one time in 2 hours if headache not relieved., Disp: 9 tablet, Rfl: 2    zoledronic acid (RECLAST) 5 MG/100ML solution infusion, Infuse 100 mL into a venous catheter 1 (One) Time., Disp: , Rfl:     diazePAM (VALIUM) 10 MG tablet, Take 1 tablet by mouth 1 (One) Time for 1 dose. Please take one hour pre procedure, Disp: 1 tablet, Rfl: 0    Allergies:  Allergies   Allergen Reactions    Codeine Unknown - Low Severity     migraine    Oxycodone Unknown - Low Severity       Medical history:  Past Medical History:   Diagnosis Date    Allergic     Headache     Osteoporosis        Surgical History:  Past Surgical History:   Procedure Laterality Date    HYSTERECTOMY  2009       Family History:  Family History   Problem Relation Age of Onset    Hyperlipidemia Mother     Diabetes Mother     Osteoporosis Father     Hyperlipidemia Sister     Leukemia Brother     Breast cancer Maternal Aunt        Social History:   Social History     Socioeconomic History    Marital status: Single   Tobacco Use    Smoking status: Every Day     Packs/day: 2.00     Years: 15.00     Additional pack  years: 0.00     Total pack years: 30.00     Types: Cigarettes     Start date: 1992     Passive exposure: Current    Smokeless tobacco: Never   Vaping Use    Vaping Use: Never used   Substance and Sexual Activity    Alcohol use: Yes     Comment: rare    Drug use: Never    Sexual activity: Defer     Patient drinks 3-4 servings of caffeine per day.       GYNECOLOGIC HISTORY:   G: 3. P: 3. AB: 0.  Last menstrual period: 53   Age at menarche: 16  Age at first childbirth: 23  Lactation/How lon months   Age at menopause: 53  Total years of oral contraceptive use: 2 years   Total years of hormone replacement therapy: 2-3 years       Physical Exam  Vitals:    23 0941   BP: 118/78   Pulse: 74   SpO2: 98%     ECOG 0 - Asymptomatic  General: NAD, well appearing  Psych: a&o x 3, normal mood and affect  Eyes: EOMI, no scleral icterus  ENMT: neck supple without masses or thyromegaly, mucus membranes moist  Resp: normal effort, CTAB  CV: RRR, no murmurs, no edema   GI: soft, NT, ND  MSK: normal gait, normal ROM in bilateral shoulders  Lymph nodes: no cervical, supraclavicular or axillary lymphadenopathy  Breast: asymmetric, L>R, large  Right: No visible abnormalities on inspection while seated, with arms raised or hands on hips. No masses, skin changes, or nipple abnormalities.  Left: No visible abnormalities on inspection while seated, with arms raised or hands on hips. No masses, skin changes, or nipple abnormalities.      Assessment:    Abnormal breast imaging    Discussion:      Plan:  Stereotactic right breast biopsy in the near future and will call with results  Monthly self breast examinations  Return to office if any new issues  Will do risk assessment after biopsy results are back  5. Valium 10mg sent in for procedure    TRISTAN Braden    I have spent 40 mins in face to face time with the patient and in chart review.    CC:  MD Nicole Quijano, MD ANTONETTE Ortiz Dragon/transcription  disclaimer:  Dictated using Dragon dictation

## 2023-12-19 ENCOUNTER — PREP FOR SURGERY (OUTPATIENT)
Dept: OTHER | Facility: HOSPITAL | Age: 68
End: 2023-12-19
Payer: MEDICARE

## 2023-12-19 ENCOUNTER — OFFICE VISIT (OUTPATIENT)
Dept: SURGERY | Facility: CLINIC | Age: 68
End: 2023-12-19
Payer: MEDICARE

## 2023-12-19 VITALS
BODY MASS INDEX: 24.38 KG/M2 | OXYGEN SATURATION: 98 % | DIASTOLIC BLOOD PRESSURE: 78 MMHG | WEIGHT: 180 LBS | SYSTOLIC BLOOD PRESSURE: 118 MMHG | HEIGHT: 72 IN | HEART RATE: 74 BPM

## 2023-12-19 DIAGNOSIS — F41.9 ANXIETY: Primary | ICD-10-CM

## 2023-12-19 DIAGNOSIS — R92.8 ABNORMAL FINDING ON BREAST IMAGING: ICD-10-CM

## 2023-12-19 DIAGNOSIS — R92.8 ABNORMAL FINDING ON BREAST IMAGING: Primary | ICD-10-CM

## 2023-12-19 PROCEDURE — 1159F MED LIST DOCD IN RCRD: CPT | Performed by: NURSE PRACTITIONER

## 2023-12-19 PROCEDURE — 99204 OFFICE O/P NEW MOD 45 MIN: CPT | Performed by: NURSE PRACTITIONER

## 2023-12-19 PROCEDURE — 1160F RVW MEDS BY RX/DR IN RCRD: CPT | Performed by: NURSE PRACTITIONER

## 2023-12-19 RX ORDER — DIAZEPAM 10 MG/1
10 TABLET ORAL ONCE
Qty: 1 TABLET | Refills: 0 | Status: SHIPPED | OUTPATIENT
Start: 2023-12-19 | End: 2023-12-19

## 2024-01-12 ENCOUNTER — HOSPITAL ENCOUNTER (OUTPATIENT)
Dept: MAMMOGRAPHY | Facility: HOSPITAL | Age: 69
Discharge: HOME | End: 2024-01-12
Payer: MEDICARE

## 2024-01-12 VITALS
WEIGHT: 180 LBS | TEMPERATURE: 98.2 F | OXYGEN SATURATION: 98 % | SYSTOLIC BLOOD PRESSURE: 121 MMHG | HEART RATE: 68 BPM | RESPIRATION RATE: 16 BRPM | BODY MASS INDEX: 24.38 KG/M2 | DIASTOLIC BLOOD PRESSURE: 73 MMHG | HEIGHT: 72 IN

## 2024-01-12 DIAGNOSIS — R92.8 ABNORMAL FINDING ON BREAST IMAGING: ICD-10-CM

## 2024-01-12 PROCEDURE — 25010000002 LIDOCAINE 1 % SOLUTION: Performed by: NURSE PRACTITIONER

## 2024-01-12 PROCEDURE — A4648 IMPLANTABLE TISSUE MARKER: HCPCS

## 2024-01-12 RX ORDER — LIDOCAINE HYDROCHLORIDE 10 MG/ML
10 INJECTION, SOLUTION INFILTRATION; PERINEURAL ONCE
Status: COMPLETED | OUTPATIENT
Start: 2024-01-12 | End: 2024-01-12

## 2024-01-12 RX ORDER — DIAZEPAM 5 MG/1
5 TABLET ORAL ONCE AS NEEDED
Status: DISCONTINUED | OUTPATIENT
Start: 2024-01-12 | End: 2024-01-13 | Stop reason: HOSPADM

## 2024-01-12 RX ADMIN — LIDOCAINE HYDROCHLORIDE 6 ML: 10 INJECTION, SOLUTION INFILTRATION; PERINEURAL at 12:45

## 2024-01-12 RX ADMIN — LIDOCAINE HYDROCHLORIDE 20 ML: 10; .005 INJECTION, SOLUTION EPIDURAL; INFILTRATION; INTRACAUDAL; PERINEURAL at 12:45

## 2024-01-12 NOTE — H&P
Name: Linnea Magdaleno ADMIT: 2024   : 1955  PCP: Lacy Rivera MD    MRN: [id number] LOS: 0 days   AGE/SEX: 68 y.o. female  ROOM: Room/bed info not found       Chief complaint right breast suspected distortion    Present Illness or Internal History:  Patient is a 68 y.o. female presents with right breast suspected distortion.     Past Surgical History:  Past Surgical History:   Procedure Laterality Date    HYSTERECTOMY         Past Medical History:  Past Medical History:   Diagnosis Date    Allergic     Headache     Osteoporosis        Home Medications:  (Not in a hospital admission)      Allergies:  Codeine and Oxycodone    Family History:  Family History   Problem Relation Age of Onset    Hyperlipidemia Mother     Diabetes Mother     Osteoporosis Father     Hyperlipidemia Sister     Leukemia Brother     Breast cancer Maternal Aunt        Social History:  Social History     Tobacco Use    Smoking status: Every Day     Packs/day: 2.00     Years: 15.00     Additional pack years: 0.00     Total pack years: 30.00     Types: Cigarettes     Start date: 1992     Passive exposure: Current    Smokeless tobacco: Never   Vaping Use    Vaping Use: Never used   Substance Use Topics    Alcohol use: Yes     Comment: rare    Drug use: Never        Objective     Physical Exam:    No exam performed today,    Vital Signs  Temp:  [98.2 °F (36.8 °C)] 98.2 °F (36.8 °C)  Heart Rate:  [72] 72  Resp:  [16] 16  BP: (123)/(84) 123/84    Anticipated Surgical Procedure:  Tomosynthesis guided right breast biopsy with clip placement    The risks, benefits and alternatives of this procedure have been discussed with the patient or responsible party: Yes        Juarez Miranda Jr., MD  24  12:04 EST

## 2024-01-12 NOTE — NURSING NOTE
Biopsy site to right upper, slightly inner breast clear with Dermabond dry and intact. No firmness or swelling noted at or around biopsy site. There is a pea sized bruise on the outer side of the right breast from the outer tip of the probe. Denies pain. Ice pack with protective covering applied to biopsy site. Discharge instructions discussed with understanding voiced by patient. Copies provided to patient. No distress noted. To home via private vehicle.

## 2024-01-13 ENCOUNTER — TELEPHONE (OUTPATIENT)
Dept: MAMMOGRAPHY | Facility: HOSPITAL | Age: 69
End: 2024-01-13
Payer: MEDICARE

## 2024-01-13 NOTE — TELEPHONE ENCOUNTER
Message left for pt. To call back if having any complaints, pain or other issues. General message left on VM

## 2024-01-15 LAB
LAB AP CASE REPORT: NORMAL
LAB AP DIAGNOSIS COMMENT: NORMAL
PATH REPORT.FINAL DX SPEC: NORMAL
PATH REPORT.GROSS SPEC: NORMAL

## 2024-01-16 ENCOUNTER — TELEPHONE (OUTPATIENT)
Dept: SURGERY | Facility: CLINIC | Age: 69
End: 2024-01-16
Payer: MEDICARE

## 2024-01-16 NOTE — TELEPHONE ENCOUNTER
Jitendra for pt and let her know I scheduled her a fu appt on 06/14 @ 1120 and if she needs anything to please give me a call       Sent pt message via [employer]   Good Morning   I just wanted to let you know I scheduled your follow up appointment with Shon HUDSON on 06/14/2024 @1120am.    If you need anything or have any questions please let me know   Thanks   Juana PATRICK

## 2024-01-16 NOTE — TELEPHONE ENCOUNTER
Please make her a follow-up appointment with me in June.  I called and discussed her benign biopsy results

## 2024-06-13 ENCOUNTER — TELEPHONE (OUTPATIENT)
Dept: SURGERY | Facility: CLINIC | Age: 69
End: 2024-06-13
Payer: MEDICARE

## 2024-06-13 NOTE — TELEPHONE ENCOUNTER
Jitendra for pt to let her know I scheduled her scr mammo here at the \[facility]\"" on 06/28 @ 12 and then she will see holger on 07/05 @ 8am   I told her if she has any questions to please give me a call back     Sent pt message via [employer]   Good Morning   I wanted to let you know I scheduled your screening mammogram here at the \[facility]\"" on 06/28 @ 12 be here by 1145 and then you will see TRISTAN Braden on 07/05 @ 8am.    If you have any questions or need anything please let me know   Thanks   Juana PATRICK

## 2024-06-28 ENCOUNTER — HOSPITAL ENCOUNTER (OUTPATIENT)
Dept: MAMMOGRAPHY | Facility: HOSPITAL | Age: 69
Discharge: HOME | End: 2024-06-28
Admitting: NURSE PRACTITIONER
Payer: MEDICARE

## 2024-06-28 DIAGNOSIS — Z12.31 SCREENING MAMMOGRAM FOR BREAST CANCER: ICD-10-CM

## 2024-06-28 PROCEDURE — 77063 BREAST TOMOSYNTHESIS BI: CPT

## 2024-06-28 PROCEDURE — 77067 SCR MAMMO BI INCL CAD: CPT

## 2024-07-01 ENCOUNTER — TELEPHONE (OUTPATIENT)
Dept: SURGERY | Facility: CLINIC | Age: 69
End: 2024-07-01
Payer: MEDICARE

## 2024-07-01 DIAGNOSIS — R92.8 ABNORMAL FINDING ON BREAST IMAGING: Primary | ICD-10-CM

## 2024-07-01 NOTE — TELEPHONE ENCOUNTER
Spoke to pt and let her know that she needs additional imaging on her right side I cx her appt with holger shaver on 07/05 and lemuel her rght dx mammo and rht limited us on 08/07 @ 730am here at the hospital and then she will see holger shaver on 08/12 @ 8am     Pt stated understanding

## 2024-07-01 NOTE — TELEPHONE ENCOUNTER
----- Message from Shon Mehta sent at 7/1/2024  9:48 AM EDT -----  She needs further imaging.  Can you please set her up for a rt diagnostic mammogram and rt limited breast ultrasound and move her appointment with me to after the new imaging  ----- Message -----  From: Interface, [facility] In  Sent: 7/1/2024   9:23 AM EDT  To: TRISTAN Braden

## 2024-07-29 DIAGNOSIS — M81.0 AGE-RELATED OSTEOPOROSIS WITHOUT CURRENT PATHOLOGICAL FRACTURE: Primary | ICD-10-CM

## 2024-07-29 RX ORDER — SODIUM CHLORIDE 9 MG/ML
20 INJECTION, SOLUTION INTRAVENOUS ONCE
OUTPATIENT
Start: 2024-07-29

## 2024-07-29 RX ORDER — ZOLEDRONIC ACID 5 MG/100ML
5 INJECTION, SOLUTION INTRAVENOUS ONCE
OUTPATIENT
Start: 2024-07-29

## 2024-08-07 ENCOUNTER — HOSPITAL ENCOUNTER (OUTPATIENT)
Dept: ULTRASOUND IMAGING | Facility: HOSPITAL | Age: 69
End: 2024-08-07
Payer: MEDICARE

## 2024-08-07 ENCOUNTER — HOSPITAL ENCOUNTER (OUTPATIENT)
Dept: MAMMOGRAPHY | Facility: HOSPITAL | Age: 69
Discharge: HOME | End: 2024-08-07
Admitting: NURSE PRACTITIONER
Payer: MEDICARE

## 2024-08-07 DIAGNOSIS — R92.8 ABNORMAL FINDING ON BREAST IMAGING: ICD-10-CM

## 2024-08-07 PROCEDURE — G0279 TOMOSYNTHESIS, MAMMO: HCPCS

## 2024-08-07 PROCEDURE — 77065 DX MAMMO INCL CAD UNI: CPT

## 2024-08-09 NOTE — PROGRESS NOTES
[facility]     Referring Provider: Lacy Rivera MD     Chief complaint: abnormal breast imaging     HPI: Ms. Linnea Magdaleno is a 67 yo woman, seen at the request of Lacy Rivera MD, for abnormal breast imaging    I personally reviewed her records and summarized her relevant breast history/imaging:    3/1/2023 bilateral screening mammogram at [facility]  FINDINGS:  There are scattered areas of fibroglandular density.  There is a focal asymmetry in the subareolar right breast. There is a  focal asymmetry in the upper central middle to posterior right breast.  There are no suspicious masses, calcifications, or areas of  architectural distortion in the left breast.  IMPRESSION/RECOMMENDATION(S):  Right breast focal asymmetries. Recommend further evaluation with CC and  MLO spot compression and lateral views with Tomosynthesis and targeted  ultrasound.  No mammographic evidence of malignancy in the left breast.  BI-RADS Category 0    4/17/2023 right diagnostic mammogram right Limited breast ultrasound at [facility]  FINDINGS:  MAMMOGRAM: Right CC and MLO spot compression 2-D and Tomosynthesis views  were obtained.    There are scattered areas of fibroglandular density.   At 12:00 in the anterior right breast, there is a persistent 0.8 cm oval  mass. At 12:00 in the middle right breast, there is a persistent  approximately 1 cm oval mass with obscured margins. These areas were  further assessed with ultrasound.  ULTRASOUND:  Targeted sonographic evaluation of the right breast was performed at  12:00 in the region of the mammographic abnormalities. At 12:00, 4 cm  from the nipple, there is a 1.1 x 0.4 x 0.8 cm minimally complicated  cyst. At 12:00, 1 cm from the nipple, there is a 0.6 x 0.7 x 0.4 cm  minimally complicated septated cyst versus cluster of microcysts. These  are probably benign.  IMPRESSION:  Masses at 12:00 in the right breast are probably benign. Recommend  short-term follow-up  diagnostic right breast mammogram and targeted  ultrasound in 6 months to document six-month stability. Comparison with  prior imaging would also be helpful, if available.  BI-RADS Category 3    11/13/2023 right diagnostic mammogram and right limited ultrasound at [facility]  FINDINGS: Unilateral right digital CC and MLO mammographic and  tomosynthesis images were obtained. Comparison is made to prior  mammography dated 03/01/2023 and 04/17/2023.  Scattered fibroglandular densities are seen throughout the right breast.  In the middle third of the right breast at the 12 o'clock position there  is a persistent area of focal asymmetry. There is a suggestion of  developing architectural distortion in the middle third at the 12  o'clock position seen primarily on the CC tomosynthesis images. No  suspicious calcifications are appreciated. There is no evidence for skin  thickening, nipple retraction or axillary adenopathy.  ULTRASOUND: Targeted sonographic evaluation of the right breast was  performed from the 11 o'clock to 1 o'clock positions. Comparison is made  to prior right breast sonography dated 04/17/2023.  At the 12 o'clock position on the order of 4 cm from the nipple there is  a 1.2 cm benign cyst.  At the 12 o'clock position on the order 1 cm from the nipple there is a  0.5 cm oval circumscribed hypoechoic mass that previously measured 0.7  cm in greatest dimension. No detectable internal vascularity is  appreciated. This is most consistent with a benign complex cyst.  IMPRESSION:  1. There is an area of suspected evolving architectural distortion  associated with an area of focal asymmetry seen in the right breast in  the middle third at the 12 o'clock position. No definite sonographic  correlate is appreciated. Further evaluation with a tomosynthesis guided  right breast biopsy is recommended.  2. Benign cysts in the right breast at the 12 o'clock position as  described.  BI-RADS Category 4    She has a personal  history of osteoporosis, squamous cell carcinoma 2022,     She has a family history of breast cancer in her maternal aunt age unknown, paternal second cousin age 58.  She has a family history of ovarian cancer in her paternal great aunt age unknown.   She has no family history of cervical cancer     Today she presents with concerns regarding abnormal breast imaging and need for stereotactic right breast biopsy  She denies any breast lumps, pain, skin changes, or nipple discharge.  She denies any prior history of abnormal mammograms or breast biopsies.     8/12/2024 interval history  On 1/12/2024 she had a right breast biopsy that returned as sclerosing adenosis and was concurrent.  She then went on to have a bilateral screening mammogram on 6/28/2024 that resulted as BI-RADS 0 due to a right breast asymmetry.  On 8/7/2024 she had a right diagnostic mammogram and right limited breast ultrasound resulted as BI-RADS 1.  She has no new breast complaints or concerns today.        Review of Systems - Oncology    Medications:    Current Outpatient Medications:     hydrOXYzine (ATARAX) 25 MG tablet, Take 1 tablet by mouth 3 (Three) Times a Day As Needed for Anxiety., Disp: 30 tablet, Rfl: 0    Sertraline HCl (ZOLOFT PO), Take  by mouth., Disp: , Rfl:     SUMAtriptan (Imitrex) 50 MG tablet, Take one tablet at onset of headache. May repeat dose one time in 2 hours if headache not relieved., Disp: 9 tablet, Rfl: 2    zoledronic acid (RECLAST) 5 MG/100ML solution infusion, Infuse 100 mL into a venous catheter 1 (One) Time. (Patient not taking: Reported on 1/12/2024), Disp: , Rfl:     Allergies:  Allergies   Allergen Reactions    Codeine Unknown - Low Severity     migraine    Oxycodone Unknown - Low Severity       Medical history:  Past Medical History:   Diagnosis Date    Allergic     Headache     Osteoporosis        Surgical History:  Past Surgical History:   Procedure Laterality Date    HYSTERECTOMY  2009       Family  History:  Family History   Problem Relation Age of Onset    Hyperlipidemia Mother     Diabetes Mother     Osteoporosis Father     Hyperlipidemia Sister     Leukemia Brother     Breast cancer Maternal Aunt        Social History:   Social History     Socioeconomic History    Marital status: Single   Tobacco Use    Smoking status: Every Day     Current packs/day: 2.00     Average packs/day: 2.0 packs/day for 32.6 years (65.2 ttl pk-yrs)     Types: Cigarettes     Start date: 1992     Passive exposure: Current    Smokeless tobacco: Never   Vaping Use    Vaping status: Never Used   Substance and Sexual Activity    Alcohol use: Yes     Comment: rare    Drug use: Never    Sexual activity: Defer     Patient drinks 3-4 servings of caffeine per day.       GYNECOLOGIC HISTORY:   G: 3. P: 3. AB: 0.  Last menstrual period: 53   Age at menarche: 16  Age at first childbirth: 23  Lactation/How lon months   Age at menopause: 53  Total years of oral contraceptive use: 2 years   Total years of hormone replacement therapy: 2-3 years       Physical Exam  There were no vitals filed for this visit.    ECOG 0 - Asymptomatic  General: NAD, well appearing  Psych: a&o x 3, normal mood and affect  Eyes: EOMI, no scleral icterus  ENMT: neck supple without masses or thyromegaly, mucus membranes moist  Resp: normal effort, CTAB  CV: RRR, no murmurs, no edema   GI: soft, NT, ND  MSK: normal gait, normal ROM in bilateral shoulders  Lymph nodes: no cervical, supraclavicular or axillary lymphadenopathy  Breast: asymmetric, L>R, large  Right: No visible abnormalities on inspection while seated, with arms raised or hands on hips. No masses, skin changes, or nipple abnormalities.  Left: No visible abnormalities on inspection while seated, with arms raised or hands on hips. No masses, skin changes, or nipple abnormalities.    Imagin2024 right breast stereotactic biopsy [facility]  The pathology result has returned as sclerosing adenosis with  focal  fibroadenomatous hyperplasia and a few distorted ductal microcysts. This  is concordant with imaging findings.  IMPRESSION:  Technically successful stereotactic guided Mammotome vacuum assisted  right breast biopsy with placement of a bowtie shaped metallic clip. The  pathology result has returned as benign. Routine mammographic follow-up  is recommended.    6/20/2024 bilateral screening mammogram at [facility]  There are scattered areas of fibroglandular density.  Biopsy clip in the right breast. There is an asymmetry in the upper  middle right breast on the MLO view. There is questioned architectural  distortion in the outer middle right breast on the cc view. There are no  suspicious masses, calcifications, or areas of architectural distortion  in the left breast.  IMPRESSION/RECOMMENDATION(S):  Right breast asymmetry. Right breast questioned architectural  distortion. Recommend further evaluation with CC and MLO spot  compression and lateral views with tomosynthesis and possible targeted  ultrasound.   No mammographic evidence of malignancy in the left breast.  BI-RADS Category 0:    8/7/2024 right diagnostic mammogram and right limited breast ultrasound at [facility]  There are scattered areas of fibroglandular density.  Previously questioned architectural distortion in the outer right breast  and the previously noted upper middle right breast asymmetry both do not  persist with spot compression and have no correlate on the lateral view.  Findings are consistent with areas of superimposition of normal breast  parenchyma.  IMPRESSION:  No mammographic evidence of malignancy in the right breast. Recommend  annual screening mammogram in June 2025.  BI-RADS Category 1: Negative    Assessment:    Abnormal breast imaging- resolved   Normal risk at 5.0%      Plan:  Considering she has normal imaging along with a normal exam and no new issues I am not can give her follow-up with appointment.  If she does have new breast  issues arise I would be happy to see her back.  She will be due for her bilateral screening mammogram in August 2025 which can be ordered by her PCP    TRISTAN Braden    I have spent 20 mins in face to face time with the patient and in chart review.    CC:  MD Nicole Quijano Joanna M, MD    EMR Dragon/transcription disclaimer:  Dictated using Dragon dictation

## 2024-08-12 ENCOUNTER — OFFICE VISIT (OUTPATIENT)
Dept: SURGERY | Facility: CLINIC | Age: 69
End: 2024-08-12
Payer: MEDICARE

## 2024-08-12 VITALS
HEART RATE: 66 BPM | WEIGHT: 179 LBS | BODY MASS INDEX: 24.24 KG/M2 | HEIGHT: 72 IN | OXYGEN SATURATION: 98 % | SYSTOLIC BLOOD PRESSURE: 126 MMHG | DIASTOLIC BLOOD PRESSURE: 80 MMHG

## 2024-08-12 DIAGNOSIS — R92.8 ABNORMAL MAMMOGRAM: Primary | ICD-10-CM

## 2024-08-12 PROCEDURE — 1160F RVW MEDS BY RX/DR IN RCRD: CPT | Performed by: NURSE PRACTITIONER

## 2024-08-12 PROCEDURE — 99213 OFFICE O/P EST LOW 20 MIN: CPT | Performed by: NURSE PRACTITIONER

## 2024-08-12 PROCEDURE — 1159F MED LIST DOCD IN RCRD: CPT | Performed by: NURSE PRACTITIONER

## 2024-08-20 ENCOUNTER — HOSPITAL ENCOUNTER (OUTPATIENT)
Dept: INFUSION THERAPY | Facility: HOSPITAL | Age: 69
Discharge: HOME | End: 2024-08-20
Admitting: FAMILY MEDICINE
Payer: MEDICARE

## 2024-08-20 VITALS
SYSTOLIC BLOOD PRESSURE: 134 MMHG | RESPIRATION RATE: 18 BRPM | TEMPERATURE: 98.1 F | DIASTOLIC BLOOD PRESSURE: 73 MMHG | HEART RATE: 66 BPM | BODY MASS INDEX: 24.41 KG/M2 | OXYGEN SATURATION: 96 % | WEIGHT: 180 LBS

## 2024-08-20 DIAGNOSIS — M81.0 AGE-RELATED OSTEOPOROSIS WITHOUT CURRENT PATHOLOGICAL FRACTURE: Primary | ICD-10-CM

## 2024-08-20 LAB
ANION GAP SERPL CALCULATED.3IONS-SCNC: 10 MMOL/L (ref 5–15)
BUN SERPL-MCNC: 11 MG/DL (ref 8–23)
BUN/CREAT SERPL: 13.4 (ref 7–25)
CALCIUM SPEC-SCNC: 9.4 MG/DL (ref 8.6–10.5)
CHLORIDE SERPL-SCNC: 105 MMOL/L (ref 98–107)
CO2 SERPL-SCNC: 24 MMOL/L (ref 22–29)
CREAT SERPL-MCNC: 0.82 MG/DL (ref 0.57–1)
EGFRCR SERPLBLD CKD-EPI 2021: 77.5 ML/MIN/1.73
GLUCOSE SERPL-MCNC: 97 MG/DL (ref 65–99)
MAGNESIUM SERPL-MCNC: 2.4 MG/DL (ref 1.6–2.4)
PHOSPHATE SERPL-MCNC: 3 MG/DL (ref 2.5–4.5)
POTASSIUM SERPL-SCNC: 4.2 MMOL/L (ref 3.5–5.2)
SODIUM SERPL-SCNC: 139 MMOL/L (ref 136–145)

## 2024-08-20 PROCEDURE — 96365 THER/PROPH/DIAG IV INF INIT: CPT

## 2024-08-20 PROCEDURE — 84100 ASSAY OF PHOSPHORUS: CPT | Performed by: FAMILY MEDICINE

## 2024-08-20 PROCEDURE — 80048 BASIC METABOLIC PNL TOTAL CA: CPT | Performed by: FAMILY MEDICINE

## 2024-08-20 PROCEDURE — 25010000002 ZOLEDRONIC ACID 5 MG/100ML SOLUTION: Performed by: FAMILY MEDICINE

## 2024-08-20 PROCEDURE — 83735 ASSAY OF MAGNESIUM: CPT | Performed by: FAMILY MEDICINE

## 2024-08-20 PROCEDURE — 36415 COLL VENOUS BLD VENIPUNCTURE: CPT

## 2024-08-20 PROCEDURE — 96374 THER/PROPH/DIAG INJ IV PUSH: CPT

## 2024-08-20 RX ORDER — SODIUM CHLORIDE 9 MG/ML
20 INJECTION, SOLUTION INTRAVENOUS ONCE
Status: CANCELLED | OUTPATIENT
Start: 2024-08-20

## 2024-08-20 RX ORDER — ZOLEDRONIC ACID 5 MG/100ML
5 INJECTION, SOLUTION INTRAVENOUS ONCE
Status: COMPLETED | OUTPATIENT
Start: 2024-08-20 | End: 2024-08-20

## 2024-08-20 RX ORDER — ZOLEDRONIC ACID 5 MG/100ML
5 INJECTION, SOLUTION INTRAVENOUS ONCE
Status: CANCELLED | OUTPATIENT
Start: 2024-08-20

## 2024-08-20 RX ORDER — SODIUM CHLORIDE 9 MG/ML
20 INJECTION, SOLUTION INTRAVENOUS ONCE
Status: DISCONTINUED | OUTPATIENT
Start: 2024-08-20 | End: 2024-08-20

## 2024-08-20 RX ADMIN — ZOLEDRONIC ACID 5 MG: 5 INJECTION INTRAVENOUS at 09:01

## 2025-01-17 RX ORDER — HYDROXYZINE HYDROCHLORIDE 25 MG/1
25 TABLET, FILM COATED ORAL 3 TIMES DAILY PRN
Qty: 30 TABLET | Refills: 0 | OUTPATIENT
Start: 2025-01-17

## 2025-01-20 DIAGNOSIS — Z87.891 PERSONAL HISTORY OF NICOTINE DEPENDENCE: ICD-10-CM

## 2025-01-20 DIAGNOSIS — Z12.2 SCREENING FOR LUNG CANCER: Primary | ICD-10-CM

## 2025-04-01 DIAGNOSIS — E78.00 ELEVATED CHOLESTEROL: ICD-10-CM

## 2025-04-01 DIAGNOSIS — Z00.00 ENCOUNTER FOR SUBSEQUENT ANNUAL WELLNESS VISIT (AWV) IN MEDICARE PATIENT: Primary | ICD-10-CM

## 2025-04-01 DIAGNOSIS — E04.1 THYROID NODULE: ICD-10-CM

## 2025-04-03 LAB
ALBUMIN SERPL-MCNC: 4.7 G/DL (ref 3.9–4.9)
ALP SERPL-CCNC: 91 IU/L (ref 44–121)
ALT SERPL-CCNC: 28 IU/L (ref 0–32)
AST SERPL-CCNC: 27 IU/L (ref 0–40)
BILIRUB SERPL-MCNC: 0.4 MG/DL (ref 0–1.2)
BUN SERPL-MCNC: 12 MG/DL (ref 8–27)
BUN/CREAT SERPL: 14 (ref 12–28)
CALCIUM SERPL-MCNC: 9.9 MG/DL (ref 8.7–10.3)
CHLORIDE SERPL-SCNC: 104 MMOL/L (ref 96–106)
CHOLEST SERPL-MCNC: 219 MG/DL (ref 100–199)
CO2 SERPL-SCNC: 21 MMOL/L (ref 20–29)
CREAT SERPL-MCNC: 0.87 MG/DL (ref 0.57–1)
EGFRCR SERPLBLD CKD-EPI 2021: 72 ML/MIN/1.73
ERYTHROCYTE [DISTWIDTH] IN BLOOD BY AUTOMATED COUNT: 13.1 % (ref 11.7–15.4)
GLOBULIN SER CALC-MCNC: 2.5 G/DL (ref 1.5–4.5)
GLUCOSE SERPL-MCNC: 97 MG/DL (ref 70–99)
HCT VFR BLD AUTO: 44.5 % (ref 34–46.6)
HDLC SERPL-MCNC: 73 MG/DL
HGB BLD-MCNC: 14.7 G/DL (ref 11.1–15.9)
LDLC SERPL CALC-MCNC: 128 MG/DL (ref 0–99)
LDLC/HDLC SERPL: 1.8 RATIO (ref 0–3.2)
MCH RBC QN AUTO: 32.2 PG (ref 26.6–33)
MCHC RBC AUTO-ENTMCNC: 33 G/DL (ref 31.5–35.7)
MCV RBC AUTO: 97 FL (ref 79–97)
PLATELET # BLD AUTO: 314 X10E3/UL (ref 150–450)
POTASSIUM SERPL-SCNC: 4.8 MMOL/L (ref 3.5–5.2)
PROT SERPL-MCNC: 7.2 G/DL (ref 6–8.5)
RBC # BLD AUTO: 4.57 X10E6/UL (ref 3.77–5.28)
SODIUM SERPL-SCNC: 143 MMOL/L (ref 134–144)
TRIGL SERPL-MCNC: 105 MG/DL (ref 0–149)
TSH SERPL DL<=0.005 MIU/L-ACNC: 1.19 UIU/ML (ref 0.45–4.5)
VLDLC SERPL CALC-MCNC: 18 MG/DL (ref 5–40)
WBC # BLD AUTO: 6.3 X10E3/UL (ref 3.4–10.8)

## 2025-04-09 ENCOUNTER — OFFICE VISIT (OUTPATIENT)
Dept: INTERNAL MEDICINE | Facility: CLINIC | Age: 70
End: 2025-04-09
Payer: MEDICARE

## 2025-04-09 VITALS
TEMPERATURE: 97.3 F | BODY MASS INDEX: 23.43 KG/M2 | DIASTOLIC BLOOD PRESSURE: 80 MMHG | HEIGHT: 72 IN | OXYGEN SATURATION: 97 % | HEART RATE: 85 BPM | WEIGHT: 173 LBS | SYSTOLIC BLOOD PRESSURE: 130 MMHG

## 2025-04-09 DIAGNOSIS — Z12.11 ENCOUNTER FOR SCREENING FOR MALIGNANT NEOPLASM OF COLON: ICD-10-CM

## 2025-04-09 DIAGNOSIS — M81.0 AGE-RELATED OSTEOPOROSIS WITHOUT CURRENT PATHOLOGICAL FRACTURE: ICD-10-CM

## 2025-04-09 DIAGNOSIS — G43.009 MIGRAINE WITHOUT AURA AND WITHOUT STATUS MIGRAINOSUS, NOT INTRACTABLE: ICD-10-CM

## 2025-04-09 DIAGNOSIS — F41.8 SITUATIONAL ANXIETY: ICD-10-CM

## 2025-04-09 DIAGNOSIS — F17.200 CURRENT SMOKER: ICD-10-CM

## 2025-04-09 DIAGNOSIS — Z71.6 ENCOUNTER FOR SMOKING CESSATION COUNSELING: ICD-10-CM

## 2025-04-09 DIAGNOSIS — Z00.00 MEDICARE ANNUAL WELLNESS VISIT, INITIAL: Primary | ICD-10-CM

## 2025-04-09 DIAGNOSIS — E04.1 THYROID NODULE: ICD-10-CM

## 2025-04-09 RX ORDER — SUMATRIPTAN 50 MG/1
TABLET, FILM COATED ORAL
Qty: 9 TABLET | Refills: 2 | Status: SHIPPED | OUTPATIENT
Start: 2025-04-09

## 2025-04-09 RX ORDER — HYDROXYZINE HYDROCHLORIDE 25 MG/1
25 TABLET, FILM COATED ORAL 3 TIMES DAILY PRN
Qty: 30 TABLET | Refills: 2 | Status: SHIPPED | OUTPATIENT
Start: 2025-04-09

## 2025-04-09 NOTE — PROGRESS NOTES
Subjective   Linnea Magdaleno is a 70 y.o. female.   Chief Complaint   Patient presents with    Medicare Wellness-Initial Visit    Osteoporosis    thyroid nodule    Anxiety    Migraine       History of Present Illness     Osteoporosis- patient had bone density done in March 2023.  It was positive for osteoporosis.  T score in lumbar spine was -2.7, in left hip -2.3, and right hip -2.0.  We started her on Fosamax.  After taking it she had a burning sensation in upper abdomen for 3 days, it resolved after 3 days.  She took it again 1 week later and had burning sensation for 3 days.  She did not take it after that. In 7/2023 we Rx Reclast. Last was 7/2024.  Pt tolerated it with no SE.  She reports normal balance.  No falls.  No weightbearing exercise.    Thyroid nodules-she had thyroid ultrasound in 11/2023.  Thyroid nodule.  Follow-up US was recommended 2 year later.  No problems with swallowing.  No change.     Migraine headaches.  She was diagnosed in 1980s.  Two migraines in the last year.  No change in character or intensity.  Stress related.  She responds to Imitrex.  No side effects.  She needs refill.     No new allergies to medications     She smokes cigarettes for years.  She started in 1980.  She smoked half pack per day for 14 years and in the last 28 years she smokes 2 packs/day.  She says she has no shortness of breath, no wheezing.  She only has cough when her allergies are acting up.  She quit smoking once for 9 months.  She did it with Chantix and patches.  She did not like the way Chantix made her feel.  She does not want to use it again.  She is aware of risks.  Currently she smokes ~1 pack/day.  She is considering quitting using patches.  No quit date yet.     She drinks a few drinks a year.  She does not use drugs.    Cologuard in 9/2022.  Mammogram up-to-date.   She does not get anymore Pap smears.  She is post hysterectomy which was secondary to benign condition.  Low-dose chest CT-12/2023.  She  is scheduled next month.       Review of Systems   Respiratory: Negative.     Cardiovascular: Negative.    Neurological:  Negative for light-headedness.   Psychiatric/Behavioral:  Negative for suicidal ideas. The patient is nervous/anxious.          Objective   Wt Readings from Last 3 Encounters:   04/09/25 78.5 kg (173 lb)   08/20/24 81.6 kg (180 lb)   08/12/24 81.2 kg (179 lb)      Vitals:    04/09/25 1302   BP: 130/80   Pulse: 85   Temp: 97.3 °F (36.3 °C)   SpO2: 97%     Temp Readings from Last 3 Encounters:   04/09/25 97.3 °F (36.3 °C)   08/20/24 98.1 °F (36.7 °C) (Oral)   01/12/24 98.2 °F (36.8 °C) (Infrared)     BP Readings from Last 3 Encounters:   04/09/25 130/80   08/20/24 134/73   08/12/24 126/80     Pulse Readings from Last 3 Encounters:   04/09/25 85   08/20/24 66   08/12/24 66     Body mass index is 23.46 kg/m².    Physical Exam  Constitutional:       Appearance: She is well-developed.   Neck:      Vascular: No carotid bruit.      Comments: ~1cm left-sided thyroid nodule palpable.  Cardiovascular:      Rate and Rhythm: Normal rate and regular rhythm.      Heart sounds: Normal heart sounds.   Pulmonary:      Effort: Pulmonary effort is normal.      Breath sounds: Normal breath sounds.   Skin:     General: Skin is warm and dry.   Neurological:      Mental Status: She is alert.   Psychiatric:         Behavior: Behavior normal.         Assessment & Plan   Diagnoses and all orders for this visit:    1. Medicare annual wellness visit, initial (Primary)    2. Encounter for screening for malignant neoplasm of colon  -     Cologuard - Stool, Per Rectum; Future    3. Age-related osteoporosis without current pathological fracture  Assessment & Plan:    Orders:    DEXA Bone Density Axial; Future      Orders:  -     DEXA Bone Density Axial; Future  -     Vitamin D,25-Hydroxy; Future  -     Magnesium; Future  -     Basic Metabolic Panel; Future    4. Thyroid nodule  -     US Thyroid; Future    5. Encounter for  smoking cessation counseling    6. Migraine without aura and without status migrainosus, not intractable  Assessment & Plan:                     7. Situational anxiety    8. Current smoker    Other orders  -     SUMAtriptan (Imitrex) 50 MG tablet; Take one tablet at onset of headache. May repeat dose one time in 2 hours if headache not relieved.  Dispense: 9 tablet; Refill: 2  -     hydrOXYzine (ATARAX) 25 MG tablet; Take 1 tablet by mouth 3 (Three) Times a Day As Needed for Anxiety.  Dispense: 30 tablet; Refill: 2        Osteoporosis-patient is due for bone density and we are ordering it.  She will be due for Reclast in July 2025.  Labs prior to infusion.  Weightbearing exercise would be helpful.  Follow-up 1 week after labs.  Migraine headaches-continue Imitrex as needed.  Follow-up in 12 months.  Sooner if problems.  Anxiety-situational.  Will continue hydroxyzine as needed.  Follow-up in 6 to 12 months.  Thyroid nodule-patient will be due for thyroid ultrasound in November 2025.  We are ordering it.          BMI is within normal parameters. No other follow-up for BMI required.

## 2025-04-09 NOTE — PROGRESS NOTES
Subjective   The ABCs of the Annual Wellness Visit  Medicare Wellness Visit      Linnea Magdaleno is a 70 y.o. patient who presents for a Medicare Wellness Visit.    The following portions of the patient's history were reviewed and   updated as appropriate: allergies, current medications, past family history, past medical history, past social history, past surgical history, and problem list.    Compared to one year ago, the patient's physical   health is the same.  Compared to one year ago, the patient's mental   health is the same.    Recent Hospitalizations:  She was not admitted to the hospital during the last year.     Current Medical Providers:  Patient Care Team:  Lacy Rivera MD as PCP - General (Family Medicine)    Outpatient Medications Prior to Visit   Medication Sig Dispense Refill    hydrOXYzine (ATARAX) 25 MG tablet Take 1 tablet by mouth 3 (Three) Times a Day As Needed for Anxiety. 30 tablet 0    SUMAtriptan (Imitrex) 50 MG tablet Take one tablet at onset of headache. May repeat dose one time in 2 hours if headache not relieved. 9 tablet 2    zoledronic acid (RECLAST) 5 MG/100ML solution infusion Infuse 100 mL into a venous catheter 1 (One) Time.       No facility-administered medications prior to visit.     No opioid medication identified on active medication list. I have reviewed chart for other potential  high risk medication/s and harmful drug interactions in the elderly.      Aspirin is not on active medication list.  Aspirin use is not indicated based on review of current medical condition/s. Risk of harm outweighs potential benefits.  .    Patient Active Problem List   Diagnosis    Migraine without aura, not intractable    Skin cancer    Seasonal allergic rhinitis due to pollen    Decreased hearing of right ear    Age-related osteoporosis without current pathological fracture     Advance Care Planning Advance Directive is not on file.  ACP discussion was declined by the patient. Patient  "does not have an advance directive, information provided.            Objective   Vitals:    25 1302   BP: 130/80   BP Location: Left arm   Patient Position: Sitting   Cuff Size: Adult   Pulse: 85   Temp: 97.3 °F (36.3 °C)   SpO2: 97%   Weight: 78.5 kg (173 lb)   Height: 182.9 cm (72.01\")   PainSc: 0-No pain       Estimated body mass index is 23.46 kg/m² as calculated from the following:    Height as of this encounter: 182.9 cm (72.01\").    Weight as of this encounter: 78.5 kg (173 lb).    BMI is within normal parameters. No other follow-up for BMI required.           Does the patient have evidence of cognitive impairment? No  Lab Results   Component Value Date    CHLPL 219 (H) 2025    TRIG 105 2025    HDL 73 2025     (H) 2025    VLDL 18 2025                                                                                               Health  Risk Assessment    Smoking Status:  Social History     Tobacco Use   Smoking Status Every Day    Current packs/day: 2.00    Average packs/day: 2.0 packs/day for 33.3 years (66.5 ttl pk-yrs)    Types: Cigarettes    Start date: 1992    Passive exposure: Current   Smokeless Tobacco Never     Alcohol Consumption:  Social History     Substance and Sexual Activity   Alcohol Use Yes    Comment: rare       Fall Risk Screen  STEADI Fall Risk Assessment was completed, and patient is at LOW risk for falls.Assessment completed on:2025    Depression Screening   Little interest or pleasure in doing things? Not at all   Feeling down, depressed, or hopeless? Not at all   PHQ-2 Total Score 0      Health Habits and Functional and Cognitive Screenin/9/2025     1:00 PM   Functional & Cognitive Status   Do you have difficulty preparing food and eating? No   Do you have difficulty bathing yourself, getting dressed or grooming yourself? No   Do you have difficulty using the toilet? No   Do you have difficulty moving around from place to " place? No   Do you have trouble with steps or getting out of a bed or a chair? No   Current Diet Well Balanced Diet   Dental Exam Up to date   Eye Exam Up to date   Exercise (times per week) 0 times per week   Current Exercises Include No Regular Exercise   Do you need help using the phone?  No   Are you deaf or do you have serious difficulty hearing?  Yes   Do you need help to go to places out of walking distance? No   Do you need help shopping? No   Do you need help preparing meals?  No   Do you need help with housework?  No   Do you need help with laundry? No   Do you need help taking your medications? No   Do you need help managing money? No   Do you ever drive or ride in a car without wearing a seat belt? No   Have you felt unusual stress, anger or loneliness in the last month? No   Who do you live with? Other   If you need help, do you have trouble finding someone available to you? No   Have you been bothered in the last four weeks by sexual problems? No   Do you have difficulty concentrating, remembering or making decisions? No           Age-appropriate Screening Schedule:  Refer to the list below for future screening recommendations based on patient's age, sex and/or medical conditions. Orders for these recommended tests are listed in the plan section. The patient has been provided with a written plan.    Health Maintenance List  Health Maintenance   Topic Date Due    ZOSTER VACCINE (1 of 2) Never done    ANNUAL WELLNESS VISIT  08/29/2024    COVID-19 Vaccine (5 - 2024-25 season) 09/01/2024    LUNG CANCER SCREENING  12/15/2024    DXA SCAN  03/01/2025    COLORECTAL CANCER SCREENING  09/19/2025    INFLUENZA VACCINE  07/01/2025    MAMMOGRAM  08/07/2026    TDAP/TD VACCINES (2 - Td or Tdap) 10/11/2031    HEPATITIS C SCREENING  Completed    Pneumococcal Vaccine 50+  Completed                                                                                                                                                 CMS Preventative Services Quick Reference  Risk Factors Identified During Encounter  Immunizations Discussed/Encouraged: Shingrix and COVID19    Information on healthy heart diet added to discharge summary.  Information on exercise for aging adults added to discharge summary.  Information on how to quit smoking added to discharge summary and discussed with patient.  She is planning to use nicotine patch.  She did not set up a quit date yet and I encouraged her to do it.  She is scheduled for low-dose chest CT on 4/29/25.  She is up-to-date with mammogram.  She will be due for colon cancer screening in fall 2025.  Order for Cologuard done.  We discussed recommendations for vaccinations including COVID-vaccine and Shingrix.  Information on living will given to patient.  Hearing is slightly decreased and she is considering checking it.    The above risks/problems have been discussed with the patient.  Pertinent information has been shared with the patient in the After Visit Summary.  An After Visit Summary and PPPS were made available to the patient.    Follow Up:   Next Medicare Wellness visit to be scheduled in 1 year.     Assessment & Plan  Medicare annual wellness visit, initial         Encounter for screening for malignant neoplasm of colon    Orders:    Cologuard - Stool, Per Rectum; Future    Age-related osteoporosis without current pathological fracture    Orders:    DEXA Bone Density Axial; Future    Thyroid nodule         Encounter for smoking cessation counseling         Migraine without aura and without status migrainosus, not intractable                 Situational anxiety         Current smoker              Follow Up:   No follow-ups on file.

## 2025-04-29 ENCOUNTER — HOSPITAL ENCOUNTER (OUTPATIENT)
Facility: HOSPITAL | Age: 70
Discharge: HOME | End: 2025-04-29
Admitting: FAMILY MEDICINE
Payer: MEDICARE

## 2025-04-29 DIAGNOSIS — Z87.891 PERSONAL HISTORY OF NICOTINE DEPENDENCE: ICD-10-CM

## 2025-04-29 DIAGNOSIS — Z12.2 SCREENING FOR LUNG CANCER: ICD-10-CM

## 2025-04-29 PROCEDURE — 71271 CT THORAX LUNG CANCER SCR C-: CPT

## 2025-06-09 DIAGNOSIS — M81.0 AGE-RELATED OSTEOPOROSIS WITHOUT CURRENT PATHOLOGICAL FRACTURE: ICD-10-CM

## 2025-06-11 LAB
25(OH)D3+25(OH)D2 SERPL-MCNC: 33.5 NG/ML (ref 30–100)
BUN SERPL-MCNC: 12 MG/DL (ref 8–27)
BUN/CREAT SERPL: 16 (ref 12–28)
CALCIUM SERPL-MCNC: 9.5 MG/DL (ref 8.7–10.3)
CHLORIDE SERPL-SCNC: 103 MMOL/L (ref 96–106)
CO2 SERPL-SCNC: 23 MMOL/L (ref 20–29)
CREAT SERPL-MCNC: 0.76 MG/DL (ref 0.57–1)
EGFRCR SERPLBLD CKD-EPI 2021: 84 ML/MIN/1.73
GLUCOSE SERPL-MCNC: 93 MG/DL (ref 70–99)
MAGNESIUM SERPL-MCNC: 2.2 MG/DL (ref 1.6–2.3)
POTASSIUM SERPL-SCNC: 4.4 MMOL/L (ref 3.5–5.2)
SODIUM SERPL-SCNC: 139 MMOL/L (ref 134–144)

## 2025-08-05 ENCOUNTER — HOSPITAL ENCOUNTER (OUTPATIENT)
Dept: ULTRASOUND IMAGING | Facility: HOSPITAL | Age: 70
Discharge: HOME | End: 2025-08-05
Payer: MEDICARE

## 2025-08-05 ENCOUNTER — HOSPITAL ENCOUNTER (OUTPATIENT)
Dept: BONE DENSITY | Facility: HOSPITAL | Age: 70
Discharge: HOME | End: 2025-08-05
Payer: MEDICARE

## 2025-08-05 DIAGNOSIS — M81.0 AGE-RELATED OSTEOPOROSIS WITHOUT CURRENT PATHOLOGICAL FRACTURE: ICD-10-CM

## 2025-08-05 DIAGNOSIS — E04.1 THYROID NODULE: ICD-10-CM

## 2025-08-05 PROCEDURE — 76536 US EXAM OF HEAD AND NECK: CPT

## 2025-08-05 PROCEDURE — 77080 DXA BONE DENSITY AXIAL: CPT

## 2025-08-18 DIAGNOSIS — E04.1 THYROID NODULE: Primary | ICD-10-CM
